# Patient Record
Sex: FEMALE | Race: WHITE | NOT HISPANIC OR LATINO | Employment: PART TIME | URBAN - METROPOLITAN AREA
[De-identification: names, ages, dates, MRNs, and addresses within clinical notes are randomized per-mention and may not be internally consistent; named-entity substitution may affect disease eponyms.]

---

## 2017-02-07 RX ORDER — NADOLOL 40 MG/1
TABLET ORAL
Qty: 30 TABLET | Refills: 6 | Status: SHIPPED | OUTPATIENT
Start: 2017-02-07 | End: 2017-09-12 | Stop reason: SDUPTHER

## 2017-09-12 RX ORDER — NADOLOL 40 MG/1
40 TABLET ORAL DAILY
Qty: 30 TABLET | Refills: 6 | Status: SHIPPED | OUTPATIENT
Start: 2017-09-12 | End: 2018-04-03 | Stop reason: SDUPTHER

## 2017-11-01 ENCOUNTER — HISTORICAL (OUTPATIENT)
Dept: RADIOLOGY | Facility: HOSPITAL | Age: 27
End: 2017-11-01

## 2017-11-01 LAB
BUN SERPL-MCNC: 11 MG/DL (ref 7–18)
CALCIUM SERPL-MCNC: 9 MG/DL (ref 8.5–10.1)
CHLORIDE SERPL-SCNC: 109 MMOL/L (ref 98–107)
CO2 SERPL-SCNC: 25 MMOL/L (ref 21–32)
CREAT SERPL-MCNC: 0.78 MG/DL (ref 0.55–1.02)
GLUCOSE SERPL-MCNC: 77 MG/DL (ref 74–106)
POTASSIUM SERPL-SCNC: 4.2 MMOL/L (ref 3.5–5.1)
SODIUM SERPL-SCNC: 143 MMOL/L (ref 136–145)

## 2017-12-10 NOTE — PROGRESS NOTES
12/10/2017    RE:ALAN LONGO  [facility]# [id number]    Sharmaine Jones MD   [address] [place] [address] [place] [address]    [facility]    I had the pleasure of seeing Alan in our adult congenital cardiology clinic in [place]. Alan Longo is a 27 y.o. female with repaired tetralogy of Fallot.  On 12/17/14, she underwent the following surgical procedure:  Redo median sternotomy, resection of right ventricular muscle bundles, pulmonary valve replacement with a 23 mm Trifecta valve and augmentation of the main pulmonary artery.  The surgery was complicated by adherence of the heart to the sternum, making dissection difficult.  They accessed the RFA for bypass, but were not able to get in the vein (chronic occlusion).  She ultimately did quite well, however, and was discharged home after 4 days.      Interval history:  I last saw her a year ago.  Since that time, she has in general done quite well.  She has had no chest pain, syncope, dyspnea on exertion, cyanosis, or edema.  She does have occasional palpitations.  She only notices this at night.  She will wake up from sleep, typically from a bad dream, and she feels like her heart is beating fast.  The symptoms last a few minutes.  This occurs a few times per week.  She does not have palpitations during the day.  She has no problems performing her normal daily activities.  She works in a pharmacy.    She has problems with both feet, and they are thinking about having surgery.  She has hypothyroidism.    She does choke on her food.  This was a bad problem as a child and got better, but continues to be a problem at times.  At the time, they were told a nerve was injured during heart surgery.  A few years ago, I had her seen in ENT clinic.  The conclusions from that visit were as follows:  In summary, this patient has normal vocal cord motion bilaterally, ruling out any functional impairment from a paralyzed  vocal cord given her remote history of TOF surgery. She does have a small glottic gap and this may contribute to her hoarseness and intermittent throat clearing with PO liquid. She tolerated PO liquid in the exam room without incident. It is likely that she is experiencing intermittent bouts of laryngeal penetration, not aspiration given her clinical picture. Discussed the only way to know for sure is with a MBSS. However, given the rarity of these episodes(Once every 1-2 weeks), it is not likely that we will catch any abnormality on that test. I have discussed a course of observation, as there is no immediate concern for aspiration. Should her symptoms of throat clearing with PO liquid intake increase in nature, we can then proceed with a MBSS to further characterize the nature of her dysphagia.     Several years ago, Jocelin was admitted to the hospital for evaluation after Holter demonstrated runs of ventricular tachycardia. During this admission, she had electrophysiology study which demonstrated no inducible ventricular arrhythmia and she was started on nadolol.     The review of systems is as noted above. It is otherwise negative for other symptoms related to the general, neurological, psychiatric, endocrine, gastrointestinal, genitourinary, respiratory, dermatologic, musculoskeletal, hematologic, and immunologic systems.     Current Outpatient Prescriptions on File Prior to Visit   Medication Sig Dispense Refill    aspirin (ECOTRIN) 81 MG EC tablet Take 81 mg by mouth once daily.      ibuprofen (ADVIL,MOTRIN) 800 MG tablet Take 800 mg by mouth every 8 (eight) hours as needed for Pain.      levothyroxine (SYNTHROID) 50 MCG tablet       nadolol (CORGARD) 40 MG tablet Take 1 tablet (40 mg total) by mouth once daily. 30 tablet 6     No current facility-administered medications on file prior to visit.    Review of patient's allergies indicates:  No Known Allergies  She is no longer taking the  "oxycodone.  IMMUNIZATIONS:UTD  SOCIAL: Currently lives with parents. She does not smoke. She will have an occasional alcoholic beverage.  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      Vitals:    12/11/17 1132 12/11/17 1133   BP: 126/76 135/87   BP Location: Right arm Left arm   Pulse: (!) 57    SpO2: 99%    Weight: 67.6 kg (149 lb 0.5 oz)    Height: 5' 2.21" (1.58 m)      GENERAL: Jocelin is a well-developed well-nourished female. She moves symmetrically. She does have a stutter.   HEENT: No dysmorphic features are noted. Visual tracking is normal with symmetric grimace. Teeth are in good repair. No lymphadenopathy is noted.    CHEST: There is a well healed median sternotomy scar and a well healed left thoracotomy scar.  CARDIAC: The precordium is quiet. S1 is single with a split S2. There is a grade 2/6 systolic ejection murmur followed by a 1/6 blowing diastolic murmur.  ABDOMEN: The abdomen is soft with no hepatosplenomegaly.  EXTREMITIES: Extremities are warm and well perfused. Pulses are good in all extremities with no edema clubbing or cyanosis noted.    I personally interpreted the following studies performed in clinic in October:  An EKG reveals normal sinus rhythm with a RBBB.  Echo reveals:  Very limited acoustic windows:.  Limited imaging suggests no obvious enlargement of the right ventricle and  qualitatively good systolic function.  Normal left ventricle structure and size.  Normal size aorta.  Bioprosthetic valve in pulmonary position not well visualized. No evidence of  stenosis or significant insufficiency.  Aortic valve poorly visualized. No stenosis. Likely trivial insufficiency.  LPA well visualized with no stenosis. RPA not as well visualized.    CPX testing 2016:  4) The PkVO2 was 20.6 ml/kg/min which is 55% of predicted equating to a functional capacity of 5.9 METS indicating moderate functional impairment.   CONCLUSIONS   CPX Conclusions:  Moderate functional impairment associated with a " normal breathing reserve, normal oxygen saturation, an adequate effort, and a borderline reduced AT. These findings are indicative of functional impairment secondary to circulatory insufficiency.     Cardiac MRI 12/2016:  Conclusion:   27 yo with tetralogy of Fallot s/p repair, s/p pulmonary valve replacement.   1. The right ventricle end diastolic volume is top normal, end systolic volume is increased. (RVEDV 110 cc/m2 for BSA, RVESV 65 cc/m2 for BSA).  RVEF 41%.  2. Normal left ventricular size with LVEF 60 %.  3. There is a pulmonary valve prosthesis. The RVOT was difficult to visualize due to artifact from sternal wires. Pulmonary insufficiency, regurgitant fraction 14%. Peak velocity 1.7 m/sec.  4. Normal size of main and RPA, mild LPA hypoplasia.   5. Aortic insufficiency, regurgitant fraction 11%, regurgitant volume 7 cc.   6. Dilated ascending aorta, meaurements as above.   7. Right atrial enlargement.   8. Tricuspid regurgitation noted.     Diagnoses:  1. Tetralogy of Fallot status post repair.  2. 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 Palpitations with a history of ventricular arrhythmia on Holter monitor and a negative EP study.  4. Mild AI  5. Choking on food - chronic issue.  No vocal cord dysfunction noted on ENT evaluation, but she does have a mild glottic gap.  6. Consider 22q11 deletion.  7. Bilateral iliac and infra-renal IVC thrombosis    Discussion:  Overall, she looks very good in clinic today.  Her echocardiogram is reassuring, but the images are suboptimal.  She does have some palpitations, and we know she has a history of nonsustained ventricular tachycardia.  To summarize, my recommendations are as follows:   1. continue aspirin  2. Sternotomy precautions no longer necessary  3. SBEP is necessary  4. 24 hour holter today to  once again assess night time palpitations  5. Continue Nadolol.  6. If holter and MRI look good, will see in 1 year with echo, ekg, holter.    Sincerely,    Mazin Catalan MD  Pediatric Cardiology  Adult Congenital Heart Disease  [facility] [address] [place] [address]  [phone]

## 2017-12-11 ENCOUNTER — CLINICAL SUPPORT (OUTPATIENT)
Dept: PEDIATRIC CARDIOLOGY | Facility: CLINIC | Age: 27
End: 2017-12-11
Payer: COMMERCIAL

## 2017-12-11 ENCOUNTER — CLINICAL SUPPORT (OUTPATIENT)
Dept: PEDIATRIC CARDIOLOGY | Facility: CLINIC | Age: 27
End: 2017-12-11
Attending: PEDIATRICS
Payer: COMMERCIAL

## 2017-12-11 ENCOUNTER — OFFICE VISIT (OUTPATIENT)
Dept: PEDIATRIC CARDIOLOGY | Facility: CLINIC | Age: 27
End: 2017-12-11
Payer: COMMERCIAL

## 2017-12-11 VITALS
WEIGHT: 149.06 LBS | OXYGEN SATURATION: 99 % | HEIGHT: 62 IN | HEART RATE: 57 BPM | DIASTOLIC BLOOD PRESSURE: 87 MMHG | BODY MASS INDEX: 27.43 KG/M2 | SYSTOLIC BLOOD PRESSURE: 135 MMHG

## 2017-12-11 VITALS
HEART RATE: 52 BPM | BODY MASS INDEX: 27.42 KG/M2 | WEIGHT: 149 LBS | DIASTOLIC BLOOD PRESSURE: 87 MMHG | RESPIRATION RATE: 16 BRPM | SYSTOLIC BLOOD PRESSURE: 135 MMHG | OXYGEN SATURATION: 99 % | HEIGHT: 62 IN

## 2017-12-11 DIAGNOSIS — I47.20 VENTRICULAR TACHYCARDIA: ICD-10-CM

## 2017-12-11 DIAGNOSIS — Z98.890 PERSONAL HISTORY OF SURGERY TO HEART AND GREAT VESSELS, PRESENTING HAZARDS TO HEALTH: ICD-10-CM

## 2017-12-11 DIAGNOSIS — Z95.3 S/P PULMONARY VALVE REPLACEMENT WITH BIOPROSTHETIC VALVE: ICD-10-CM

## 2017-12-11 DIAGNOSIS — Z87.74 TETRALOGY OF FALLOT S/P REPAIR: Primary | ICD-10-CM

## 2017-12-11 DIAGNOSIS — Q21.3 TETRALOGY OF FALLOT: ICD-10-CM

## 2017-12-11 DIAGNOSIS — Q24.9 ADULT CONGENITAL HEART DISEASE: ICD-10-CM

## 2017-12-11 DIAGNOSIS — R00.2 PALPITATIONS: ICD-10-CM

## 2017-12-11 DIAGNOSIS — R00.2 PALPITATIONS: Primary | ICD-10-CM

## 2017-12-11 PROCEDURE — 93000 ELECTROCARDIOGRAM COMPLETE: CPT | Mod: S$GLB,,, | Performed by: PEDIATRICS

## 2017-12-11 PROCEDURE — 99214 OFFICE O/P EST MOD 30 MIN: CPT | Mod: 25,S$GLB,, | Performed by: PEDIATRICS

## 2017-12-11 PROCEDURE — 93227 XTRNL ECG REC<48 HR R&I: CPT | Mod: S$GLB,,, | Performed by: PEDIATRICS

## 2018-04-03 RX ORDER — NADOLOL 40 MG/1
TABLET ORAL
Qty: 30 TABLET | Refills: 11 | Status: SHIPPED | OUTPATIENT
Start: 2018-04-03 | End: 2019-03-27 | Stop reason: SDUPTHER

## 2018-09-28 ENCOUNTER — DOCUMENTATION ONLY (OUTPATIENT)
Dept: PEDIATRIC CARDIOLOGY | Facility: CLINIC | Age: 28
End: 2018-09-28

## 2018-09-28 ENCOUNTER — TELEPHONE (OUTPATIENT)
Dept: PEDIATRIC CARDIOLOGY | Facility: CLINIC | Age: 28
End: 2018-09-28

## 2018-09-28 NOTE — TELEPHONE ENCOUNTER
Spoke with Dr Catalan regarding patients b/p concerns. Informed mother that Dr. Catalan wants her to go the ER to get checked out for the high b/p. Made mom aware that the  prolong b/p can lead to a stroke or renal failure, this b/p issues is not heart related. Mom verified that she understands and will head to the ER in 30 min.

## 2018-09-28 NOTE — PROGRESS NOTES
The family called and spoke with my nursing staff.  Jocelin has had headache and severely elevated blood pressure.  We inform them that her congenital heart disease is not associated with high blood pressure, and her blood pressure needs to be evaluated immediately.  She was instructed to go to her local emergency room.

## 2018-09-28 NOTE — TELEPHONE ENCOUNTER
Spoke with patient mother via telephone. Patient has been complaining of headaches and racing heart beat since last night. Mother took patient blood pressure, reported to be 193/107. Mother repeated blood pressure until lowest prior to bed was 178-95. Mother reported to RN she did not go to ER last night because she feels that they do not do anything for patient due to heart issues. Mother reported blood pressure was 178-106 this AM. Patient has not been ill or running fever in the last month. Patient has been taking Nadolol appropriately and reported has not missed any doses. The only change is patient has started taking Vitamin D for past week and reports to have headaches for past week. Informed mother to bring patient to ER to have her evaluated. Will update Dr. Catalan on plan of care at this time.

## 2018-10-01 ENCOUNTER — TELEPHONE (OUTPATIENT)
Dept: CARDIOLOGY | Facility: CLINIC | Age: 28
End: 2018-10-01

## 2018-10-01 NOTE — TELEPHONE ENCOUNTER
Patient's blood pressure is elevated again this morning (160/101).  ED instructed them to go back if it got this high again. Mother calling to let us know.  Mother is frustrated with continuing elevated BP.  Suggested she contact nephrology to have her evaluated.  She expressed understanding and will contact nephrology either before or after ED visit.

## 2018-10-01 NOTE — TELEPHONE ENCOUNTER
----- Message from Kamilla Jacques sent at 10/1/2018  9:55 AM CDT -----  Contact: Desiree Diamond [phone]  Needs Advice    Reason for call: Er visit/elevated BP on friday        Communication Preference: Desiree Diamond [phone]    Additional Information: went to ED on Friday for eval and wants to discuss er findings with Dr. Catalan as well as Rx prescribed by ER doctor.

## 2018-10-01 NOTE — TELEPHONE ENCOUNTER
Spoke to mom a few minutes ago and scheduled pt 10/11 in [place].    Called back and spoke to dad.  R/s appts to [facility] 10/8.  Father will relay to mother.  Address given.

## 2018-10-03 ENCOUNTER — TELEPHONE (OUTPATIENT)
Dept: PEDIATRIC CARDIOLOGY | Facility: CLINIC | Age: 28
End: 2018-10-03

## 2018-10-03 NOTE — TELEPHONE ENCOUNTER
Mom messaged staff concerning elevated b/p and when should she take the clonidine tab the ER prescribed for high b/p. Spoke with Dr Hossein URIAS regarding mothers concerns. Informed her that he will up Losartan dose to 100 mg daily. Mom states she has been taking 100mg every day since Monday, and her b/p is elevated at night. Encourage her to take Losartan 50 mg twice a day, once in the morning and once at night. Dr Catalan suggest if the b/p is over 180/110 to take the clonidine does the ER prescribed. Mom verifies understanding all information.

## 2018-10-08 ENCOUNTER — OFFICE VISIT (OUTPATIENT)
Dept: PEDIATRIC CARDIOLOGY | Facility: CLINIC | Age: 28
End: 2018-10-08
Payer: COMMERCIAL

## 2018-10-08 ENCOUNTER — CLINICAL SUPPORT (OUTPATIENT)
Dept: PEDIATRIC CARDIOLOGY | Facility: CLINIC | Age: 28
End: 2018-10-08
Payer: COMMERCIAL

## 2018-10-08 VITALS
WEIGHT: 152 LBS | OXYGEN SATURATION: 100 % | HEART RATE: 61 BPM | BODY MASS INDEX: 27.97 KG/M2 | RESPIRATION RATE: 18 BRPM | DIASTOLIC BLOOD PRESSURE: 84 MMHG | HEIGHT: 62 IN | SYSTOLIC BLOOD PRESSURE: 125 MMHG

## 2018-10-08 DIAGNOSIS — Z95.3 S/P PULMONARY VALVE REPLACEMENT WITH BIOPROSTHETIC VALVE: ICD-10-CM

## 2018-10-08 DIAGNOSIS — Q21.3 TETRALOGY OF FALLOT: ICD-10-CM

## 2018-10-08 DIAGNOSIS — I47.20 VENTRICULAR TACHYCARDIA: ICD-10-CM

## 2018-10-08 DIAGNOSIS — Q24.9 ADULT CONGENITAL HEART DISEASE: ICD-10-CM

## 2018-10-08 DIAGNOSIS — Z98.890 PERSONAL HISTORY OF SURGERY TO HEART AND GREAT VESSELS, PRESENTING HAZARDS TO HEALTH: ICD-10-CM

## 2018-10-08 PROCEDURE — 93000 ELECTROCARDIOGRAM COMPLETE: CPT | Mod: S$GLB,,, | Performed by: PEDIATRICS

## 2018-10-08 PROCEDURE — 99214 OFFICE O/P EST MOD 30 MIN: CPT | Mod: 25,S$GLB,, | Performed by: PEDIATRICS

## 2018-10-08 PROCEDURE — 3008F BODY MASS INDEX DOCD: CPT | Mod: CPTII,S$GLB,, | Performed by: PEDIATRICS

## 2018-10-08 RX ORDER — LOSARTAN POTASSIUM 50 MG/1
50 TABLET ORAL 2 TIMES DAILY
Qty: 60 TABLET | Refills: 11 | Status: SHIPPED | OUTPATIENT
Start: 2018-10-08 | End: 2019-10-14 | Stop reason: SDUPTHER

## 2018-10-08 RX ORDER — HYDROCHLOROTHIAZIDE 12.5 MG/1
12.5 CAPSULE ORAL
COMMUNITY
Start: 2018-09-28 | End: 2018-11-14 | Stop reason: SDUPTHER

## 2018-10-08 RX ORDER — ACETAMINOPHEN AND CODEINE PHOSPHATE 120; 12 MG/5ML; MG/5ML
1 SOLUTION ORAL DAILY
Refills: 11 | COMMUNITY
Start: 2018-09-10 | End: 2022-09-21

## 2018-10-08 RX ORDER — LOSARTAN POTASSIUM 50 MG/1
50 TABLET ORAL
COMMUNITY
Start: 2018-09-29 | End: 2018-10-08 | Stop reason: SDUPTHER

## 2018-10-08 NOTE — LETTER
October 8, 2018      Sharmaine Jones MD  [address] [place] [address] [place] [address]           [facility]  [address] [place] [address]  [place] [address]  Phone: [phone]  Fax: [phone]          Patient: Jocelin Longo   MR Number: [id number]   YOB: 1990   Date of Visit: 10/8/2018       Dear Dr. Sharmaine Jones:    Thank you for referring Jocelin Longo to me for evaluation. Attached you will find relevant portions of my assessment and plan of care.    If you have questions, please do not hesitate to call me. I look forward to following Jocelin Longo along with you.    Sincerely,    Mazin Catalan MD    Enclosure  CC:  No Recipients    If you would like to receive this communication electronically, please contact [email or web address] or [phone] to request more information on [facility] Link access.    For providers and/or their staff who would like to refer a patient to [facility], please contact us through our one-stop-shop provider referral line, [facility], at [phone].    If you feel you have received this communication in error or would no longer like to receive these types of communications, please e-mail [email or web address]

## 2018-10-08 NOTE — PROGRESS NOTES
10/08/2018    RE:ALAN LONGO  [facility]# [id number]    Sharmaine Jones MD   [address] [place] [address] [place] [address]    [facility]    I had the pleasure of seeing Alan in our adult congenital cardiology clinic in [place]. Alan Longo is a 28 y.o. female with repaired tetralogy of Fallot.  On 12/17/14, she underwent the following surgical procedure:  Redo median sternotomy, resection of right ventricular muscle bundles, pulmonary valve replacement with a 23 mm Trifecta valve and augmentation of the main pulmonary artery.  The surgery was complicated by adherence of the heart to the sternum, making dissection difficult.  They accessed the RFA for bypass, but were not able to get in the vein (chronic occlusion).  She ultimately did quite well, however, and was discharged home after 4 days.      Interval history:  I last saw her about 11 months ago.  She had done well until about 2 weeks ago when she developed a headache associated with her menstrual period.  Mom checked a blood pressure, and it was very sade (around 200/100).  At my urging, she went to the ER where her BP was quite high.  She has been started on losartan and HCTZ, and the blood pressure has improved.  She denies edema, chest pain, dyspnea on exertion, shortness of breath.  She denies hematuria.  She had a renal US and abdominal CTA that showed no evidence of kidney disease or renal artery stenosis, and her creatinine and thyroid studies were normal.  Mom wonders if this could be hormonal in nature as she frequently gets headaches with her cycle.  Her BP has been under much better control with losartan BID instead of daily.    She has problems with both feet, and they are thinking about having surgery.  She has hypothyroidism.    She does choke on her food.  This was a bad problem as a child and got better, but continues to be a problem at times.  At the time, they were told a nerve was  injured during heart surgery.  A few years ago, I had her seen in ENT clinic.  The conclusions from that visit were as follows:  In summary, this patient has normal vocal cord motion bilaterally, ruling out any functional impairment from a paralyzed vocal cord given her remote history of TOF surgery. She does have a small glottic gap and this may contribute to her hoarseness and intermittent throat clearing with PO liquid. She tolerated PO liquid in the exam room without incident. It is likely that she is experiencing intermittent bouts of laryngeal penetration, not aspiration given her clinical picture. Discussed the only way to know for sure is with a MBSS. However, given the rarity of these episodes(Once every 1-2 weeks), it is not likely that we will catch any abnormality on that test. I have discussed a course of observation, as there is no immediate concern for aspiration. Should her symptoms of throat clearing with PO liquid intake increase in nature, we can then proceed with a MBSS to further characterize the nature of her dysphagia.     Several years ago, Jocelin was admitted to the hospital for evaluation after Holter demonstrated runs of ventricular tachycardia. During this admission, she had electrophysiology study which demonstrated no inducible ventricular arrhythmia and she was started on nadolol.     The review of systems is as noted above. It is otherwise negative for other symptoms related to the general, neurological, psychiatric, endocrine, gastrointestinal, genitourinary, respiratory, dermatologic, musculoskeletal, hematologic, and immunologic systems.     Current Outpatient Medications on File Prior to Visit   Medication Sig Dispense Refill    hydroCHLOROthiazide (MICROZIDE) 12.5 mg capsule Take 12.5 mg by mouth.      [DISCONTINUED] losartan (COZAAR) 50 MG tablet Take 50 mg by mouth.      aspirin (ECOTRIN) 81 MG EC tablet Take 81 mg by mouth once daily.      ibuprofen (ADVIL,MOTRIN) 800 MG  "tablet Take 800 mg by mouth every 8 (eight) hours as needed for Pain.      levothyroxine (SYNTHROID) 50 MCG tablet       nadolol (CORGARD) 40 MG tablet take 1 tablet by mouth once a day 30 tablet 11    norethindrone (MICRONOR) 0.35 mg tablet Take 1 tablet by mouth once daily.  11    ranitidine (ZANTAC) 150 MG tablet Take 150 mg by mouth once daily.       No current facility-administered medications on file prior to visit.    Review of patient's allergies indicates:  No Known Allergies  She is no longer taking the oxycodone.  IMMUNIZATIONS:UTD  SOCIAL: Currently lives with parents. She does not smoke. She will have an occasional alcoholic beverage.  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      Vitals:    10/08/18 1011   BP: 125/84   BP Location: Right arm   Patient Position: Sitting   BP Method: Medium (Automatic)   Pulse: 61   Resp: 18   SpO2: 100%   Weight: 68.9 kg (152 lb)   Height: 5' 2.21" (1.58 m)     GENERAL: Jocelin is a well-developed well-nourished female. She moves symmetrically. She does have a stutter.   HEENT: No dysmorphic features are noted. Visual tracking is normal with symmetric grimace. Teeth are in good repair. No lymphadenopathy is noted.    CHEST: There is a well healed median sternotomy scar and a well healed left thoracotomy scar.  CARDIAC: The precordium is quiet. S1 is single with a split S2. There is a grade 1/6 systolic ejection murmur.  ABDOMEN: The abdomen is soft with no hepatosplenomegaly.  EXTREMITIES: Extremities are warm and well perfused. Pulses are good in all extremities with no edema clubbing or cyanosis noted.    I personally interpreted the following studies performed in clinic in October:  An EKG reveals normal sinus rhythm with a RBBB.  Echo reveals:  Very limited acoustic windows:.  Limited imaging suggests no obvious enlargement of the right ventricle and  qualitatively good systolic function.  Normal left ventricle structure and size.  Normal size " aorta.  Bioprosthetic valve in pulmonary position not well visualized. No evidence of  stenosis or significant insufficiency.  Aortic valve poorly visualized. No stenosis. Likely trivial insufficiency.  LPA well visualized with no stenosis. RPA not as well visualized.    CPX testing 2016:  4) The PkVO2 was 20.6 ml/kg/min which is 55% of predicted equating to a functional capacity of 5.9 METS indicating moderate functional impairment.   CONCLUSIONS   CPX Conclusions:  Moderate functional impairment associated with a normal breathing reserve, normal oxygen saturation, an adequate effort, and a borderline reduced AT. These findings are indicative of functional impairment secondary to circulatory insufficiency.     Cardiac MRI 12/2016:  Conclusion:   25 yo with tetralogy of Fallot s/p repair, s/p pulmonary valve replacement.   1. The right ventricle end diastolic volume is top normal, end systolic volume is increased. (RVEDV 110 cc/m2 for BSA, RVESV 65 cc/m2 for BSA).  RVEF 41%.  2. Normal left ventricular size with LVEF 60 %.  3. There is a pulmonary valve prosthesis. The RVOT was difficult to visualize due to artifact from sternal wires. Pulmonary insufficiency, regurgitant fraction 14%. Peak velocity 1.7 m/sec.  4. Normal size of main and RPA, mild LPA hypoplasia.   5. Aortic insufficiency, regurgitant fraction 11%, regurgitant volume 7 cc.   6. Dilated ascending aorta, meaurements as above.   7. Right atrial enlargement.   8. Tricuspid regurgitation noted.     Diagnoses:  1. Tetralogy of Fallot status post repair.  2. 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 Palpitations with a history of ventricular arrhythmia on Holter monitor and a negative EP study.  4. Mild AI  5. Choking on food - chronic issue.  No vocal cord dysfunction noted on ENT evaluation,  but she does have a mild glottic gap.  6. Consider 22q11 deletion.  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 Significant hypertension.  Not due to her congenital heart disease.    Discussion:  Her hypertension is significant and is NOT related to her tetralogy of Fallot.  She is under much better control..  To summarize, my recommendations are as follows:   1. continue aspirin  2. continue current doses of losartan, HCTZ, nadalol  3. SBEP is necessary  4. follow up in 3 months with vitals only.  5. Contact me with any issues.  Any neuro changes, shortness of breath, edema, etc... should prompt immediate evaluation.    Sincerely,    Mazin Catalan MD  Pediatric Cardiology  Adult Congenital Heart Disease  [facility]  [address] [place] [address]  [phone]

## 2018-10-08 NOTE — PROGRESS NOTES
Pleasant cooperative young lady with extremely poor acoustic windows.      1. S/p TOF complete repair.  2. Subjectively normal LV systolic function.

## 2018-11-14 RX ORDER — HYDROCHLOROTHIAZIDE 12.5 MG/1
12.5 CAPSULE ORAL DAILY
Qty: 30 CAPSULE | Refills: 10 | Status: SHIPPED | OUTPATIENT
Start: 2018-11-14 | End: 2018-12-10

## 2018-11-14 NOTE — TELEPHONE ENCOUNTER
----- Message from Kamilla Jacques sent at 11/14/2018  2:27 PM CST -----  Contact: Desiree Bowers [phone]  Needs Advice    Reason for call: Rx hydroCHLOROthiazide (MICROZIDE) 12.5 mg capsule        Communication Preference: Desiree Bowers [phone]    Additional Information: Pt has run out of fluid pills and BP is beginning to rise again. Mom is wondering if the pills should be refilled or if Dr. Catalan would like to wait until he sees the pt to make that decision.

## 2018-12-10 ENCOUNTER — OFFICE VISIT (OUTPATIENT)
Dept: PEDIATRIC CARDIOLOGY | Facility: CLINIC | Age: 28
End: 2018-12-10
Payer: COMMERCIAL

## 2018-12-10 VITALS
DIASTOLIC BLOOD PRESSURE: 89 MMHG | SYSTOLIC BLOOD PRESSURE: 139 MMHG | RESPIRATION RATE: 18 BRPM | WEIGHT: 150 LBS | OXYGEN SATURATION: 99 % | HEART RATE: 54 BPM | BODY MASS INDEX: 27.6 KG/M2 | HEIGHT: 62 IN

## 2018-12-10 DIAGNOSIS — Z87.74 TETRALOGY OF FALLOT S/P REPAIR: ICD-10-CM

## 2018-12-10 DIAGNOSIS — Q24.9 ADULT CONGENITAL HEART DISEASE: Primary | ICD-10-CM

## 2018-12-10 DIAGNOSIS — I10 ESSENTIAL HYPERTENSION: ICD-10-CM

## 2018-12-10 DIAGNOSIS — Z98.890 PERSONAL HISTORY OF SURGERY TO HEART AND GREAT VESSELS, PRESENTING HAZARDS TO HEALTH: ICD-10-CM

## 2018-12-10 DIAGNOSIS — Z95.3 S/P PULMONARY VALVE REPLACEMENT WITH BIOPROSTHETIC VALVE: ICD-10-CM

## 2018-12-10 PROCEDURE — 99213 OFFICE O/P EST LOW 20 MIN: CPT | Mod: S$GLB,,, | Performed by: PEDIATRICS

## 2018-12-10 RX ORDER — HYDROCHLOROTHIAZIDE 25 MG/1
25 TABLET ORAL DAILY
Qty: 30 TABLET | Refills: 11 | Status: SHIPPED | OUTPATIENT
Start: 2018-12-10 | End: 2019-12-02 | Stop reason: SDUPTHER

## 2018-12-10 NOTE — PROGRESS NOTES
12/10/2018    RE:ALAN LONGO  [facility]# [id number]    Sharmaine Jones MD   [address] [place] [address] [place] [address]    [facility]    I had the pleasure of seeing Alan in our adult congenital cardiology clinic in [place]. Alan Longo is a 28 y.o. female with repaired tetralogy of Fallot.  To summarize, her diagnoses are as follows:   Diagnoses:  1. Tetralogy of Fallot status post repair.  2. 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 Palpitations with a history of ventricular arrhythmia on Holter monitor and a negative EP study.  4. Mild AI  5. Choking on food - chronic issue.  No vocal cord dysfunction noted on ENT evaluation, but she does have a mild glottic gap.  6. Consider 22q11 deletion.  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 Significant hypertension.  Not due to her congenital heart disease.    Discussion:  Her hypertension is significant and is NOT related to her tetralogy of Fallot.  She remains mildly hypertensive.  To summarize, my recommendations are as follows:   1. increase HCTZ to 25mg daily  2. continue current doses of losartan, nadalol, aspirin  3. SBEP is necessary  4. follow up in 3 months with vitals only.  At a minimum, repeat echo October 2019.  5. Contact me with any issues.  Any neuro changes, shortness of breath, edema, etc... should prompt immediate evaluation.    Interval history:  She has done well over the last 3 months with no chest pain, palpitations, dyspnea on exertion, palpitations, shortness of breath, or edema.  In the morning, her BP is typically around 120/80.  In the evening, it is often higher, around 140-150 systolic.    I last saw her on 10/8/18.  At that time, I got the following history:  She had done well until about 2 weeks ago  when she developed a headache associated with her menstrual period.  Mom checked a blood pressure, and it was very sade (around 200/100).  At my urging, she went to the ER where her BP was quite high.  She has been started on losartan and HCTZ, and the blood pressure has improved.  She denies edema, chest pain, dyspnea on exertion, shortness of breath.  She denies hematuria.  She had a renal US and abdominal CTA that showed no evidence of kidney disease or renal artery stenosis, and her creatinine and thyroid studies were normal.  Mom wonders if this could be hormonal in nature as she frequently gets headaches with her cycle.  Her BP has been under much better control with losartan BID instead of daily.    She has problems with both feet, and they are thinking about having surgery.  She has hypothyroidism.  On 12/17/14, she underwent the following surgical procedure:  Redo median sternotomy, resection of right ventricular muscle bundles, pulmonary valve replacement with a 23 mm Trifecta valve and augmentation of the main pulmonary artery.  The surgery was complicated by adherence of the heart to the sternum, making dissection difficult.  They accessed the RFA for bypass, but were not able to get in the vein (chronic occlusion).  She ultimately did quite well, however, and was discharged home after 4 days.      She does choke on her food.  This was a bad problem as a child and got better, but continues to be a problem at times.  At the time, they were told a nerve was injured during heart surgery.  A few years ago, I had her seen in ENT clinic.  The conclusions from that visit were as follows:  In summary, this patient has normal vocal cord motion bilaterally, ruling out any functional impairment from a paralyzed vocal cord given her remote history of TOF surgery. She does have a small glottic gap and this may contribute to her hoarseness and intermittent throat clearing with PO liquid. She tolerated PO liquid in  the exam room without incident. It is likely that she is experiencing intermittent bouts of laryngeal penetration, not aspiration given her clinical picture. Discussed the only way to know for sure is with a MBSS. However, given the rarity of these episodes(Once every 1-2 weeks), it is not likely that we will catch any abnormality on that test. I have discussed a course of observation, as there is no immediate concern for aspiration. Should her symptoms of throat clearing with PO liquid intake increase in nature, we can then proceed with a MBSS to further characterize the nature of her dysphagia.     Several years ago, Jocelin was admitted to the hospital for evaluation after Holter demonstrated runs of ventricular tachycardia. During this admission, she had electrophysiology study which demonstrated no inducible ventricular arrhythmia and she was started on nadolol.     The review of systems is as noted above. It is otherwise negative for other symptoms related to the general, neurological, psychiatric, endocrine, gastrointestinal, genitourinary, respiratory, dermatologic, musculoskeletal, hematologic, and immunologic systems.     Current Outpatient Medications on File Prior to Visit   Medication Sig Dispense Refill    aspirin (ECOTRIN) 81 MG EC tablet Take 81 mg by mouth once daily.      ibuprofen (ADVIL,MOTRIN) 800 MG tablet Take 800 mg by mouth every 8 (eight) hours as needed for Pain.      levothyroxine (SYNTHROID) 50 MCG tablet       losartan (COZAAR) 50 MG tablet Take 1 tablet (50 mg total) by mouth 2 (two) times daily. 60 tablet 11    nadolol (CORGARD) 40 MG tablet take 1 tablet by mouth once a day 30 tablet 11    norethindrone (MICRONOR) 0.35 mg tablet Take 1 tablet by mouth once daily.  11    ranitidine (ZANTAC) 150 MG tablet Take 150 mg by mouth once daily.      [DISCONTINUED] hydroCHLOROthiazide (MICROZIDE) 12.5 mg capsule Take 1 capsule (12.5 mg total) by mouth once daily. 30 capsule 10     No  "current facility-administered medications on file prior to visit.    Review of patient's allergies indicates:  No Known Allergies  She is no longer taking the oxycodone.  IMMUNIZATIONS:UTD  SOCIAL: Currently lives with parents. She does not smoke. She will have an occasional alcoholic beverage.  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      Vitals:    12/10/18 0819   BP: 139/89   BP Location: Right arm   Patient Position: Sitting   BP Method: Medium (Automatic)   Pulse: (!) 54   Resp: 18   SpO2: 99%   Weight: 68 kg (150 lb)   Height: 5' 2.21" (1.58 m)     GENERAL: Jocelin is a well-developed well-nourished female. She moves symmetrically. She does have a stutter.   HEENT: No dysmorphic features are noted. Visual tracking is normal with symmetric grimace. Teeth are in good repair. No lymphadenopathy is noted.    CHEST: There is a well healed median sternotomy scar and a well healed left thoracotomy scar.  CARDIAC: The precordium is quiet. S1 is single with a split S2. There is a grade 1/6 systolic ejection murmur and a 1-2/6 soft, low pitched diastolic murmur at the RUSB.  ABDOMEN: The abdomen is soft with no hepatosplenomegaly.  EXTREMITIES: Extremities are warm and well perfused. Pulses are good in all extremities with no edema clubbing or cyanosis noted.    I personally interpreted the following studies performed in clinic in October:    Echo 10/8/18 reveals:  Very limited acoustic windows:.  Limited imaging suggests no obvious enlargement of the right ventricle and  qualitatively good systolic function.  Normal left ventricle structure and size.  Normal size aorta.  Bioprosthetic valve in pulmonary position not well visualized. No evidence of  stenosis or significant insufficiency.  Aortic valve poorly visualized. No stenosis. Likely trivial insufficiency.  LPA well visualized with no stenosis. RPA not as well visualized.    CPX testing 2016:  4) The PkVO2 was 20.6 ml/kg/min which is 55% of predicted equating " to a functional capacity of 5.9 METS indicating moderate functional impairment.   CONCLUSIONS   CPX Conclusions:  Moderate functional impairment associated with a normal breathing reserve, normal oxygen saturation, an adequate effort, and a borderline reduced AT. These findings are indicative of functional impairment secondary to circulatory insufficiency.     Cardiac MRI 12/2016:  Conclusion:   27 yo with tetralogy of Fallot s/p repair, s/p pulmonary valve replacement.   1. The right ventricle end diastolic volume is top normal, end systolic volume is increased. (RVEDV 110 cc/m2 for BSA, RVESV 65 cc/m2 for BSA).  RVEF 41%.  2. Normal left ventricular size with LVEF 60 %.  3. There is a pulmonary valve prosthesis. The RVOT was difficult to visualize due to artifact from sternal wires. Pulmonary insufficiency, regurgitant fraction 14%. Peak velocity 1.7 m/sec.  4. Normal size of main and RPA, mild LPA hypoplasia.   5. Aortic insufficiency, regurgitant fraction 11%, regurgitant volume 7 cc.   6. Dilated ascending aorta, meaurements as above.   7. Right atrial enlargement.   8. Tricuspid regurgitation noted.     Sincerely,    Mazin Catalan MD  Pediatric Cardiology  Adult Congenital Heart Disease  [facility]  [address] [place] [address]  [phone]

## 2019-03-01 DIAGNOSIS — Q24.9 ADULT CONGENITAL HEART DISEASE: Primary | ICD-10-CM

## 2019-03-11 ENCOUNTER — OFFICE VISIT (OUTPATIENT)
Dept: PEDIATRIC CARDIOLOGY | Facility: CLINIC | Age: 29
End: 2019-03-11
Payer: COMMERCIAL

## 2019-03-11 VITALS
SYSTOLIC BLOOD PRESSURE: 112 MMHG | WEIGHT: 154 LBS | HEIGHT: 62 IN | BODY MASS INDEX: 28.34 KG/M2 | OXYGEN SATURATION: 96 % | DIASTOLIC BLOOD PRESSURE: 70 MMHG | RESPIRATION RATE: 16 BRPM | HEART RATE: 64 BPM

## 2019-03-11 DIAGNOSIS — Q24.9 ADULT CONGENITAL HEART DISEASE: Primary | ICD-10-CM

## 2019-03-11 DIAGNOSIS — I10 ESSENTIAL HYPERTENSION: ICD-10-CM

## 2019-03-11 DIAGNOSIS — Z95.3 S/P PULMONARY VALVE REPLACEMENT WITH BIOPROSTHETIC VALVE: ICD-10-CM

## 2019-03-11 PROCEDURE — 99213 PR OFFICE/OUTPT VISIT, EST, LEVL III, 20-29 MIN: ICD-10-PCS | Mod: S$GLB,,, | Performed by: PEDIATRICS

## 2019-03-11 PROCEDURE — 3008F PR BODY MASS INDEX (BMI) DOCUMENTED: ICD-10-PCS | Mod: CPTII,S$GLB,, | Performed by: PEDIATRICS

## 2019-03-11 PROCEDURE — 99213 OFFICE O/P EST LOW 20 MIN: CPT | Mod: S$GLB,,, | Performed by: PEDIATRICS

## 2019-03-11 PROCEDURE — 3078F PR MOST RECENT DIASTOLIC BLOOD PRESSURE < 80 MM HG: ICD-10-PCS | Mod: CPTII,S$GLB,, | Performed by: PEDIATRICS

## 2019-03-11 PROCEDURE — 3074F PR MOST RECENT SYSTOLIC BLOOD PRESSURE < 130 MM HG: ICD-10-PCS | Mod: CPTII,S$GLB,, | Performed by: PEDIATRICS

## 2019-03-11 PROCEDURE — 3008F BODY MASS INDEX DOCD: CPT | Mod: CPTII,S$GLB,, | Performed by: PEDIATRICS

## 2019-03-11 PROCEDURE — 3078F DIAST BP <80 MM HG: CPT | Mod: CPTII,S$GLB,, | Performed by: PEDIATRICS

## 2019-03-11 PROCEDURE — 3074F SYST BP LT 130 MM HG: CPT | Mod: CPTII,S$GLB,, | Performed by: PEDIATRICS

## 2019-03-11 NOTE — PROGRESS NOTES
03/11/2019    RE:ALAN LONGO  [facility]# [id number]    Sharmaine Jones MD   [address] [place] [address] [place] [address]     Dr. Nahid Junior  Oral Surgeon  [place], [place] [facility]    Dear Doctors:    I had the pleasure of seeing Alan in our adult congenital cardiology clinic in [place]. Alan Longo is a 29 y.o. female with repaired tetralogy of Fallot.  To summarize, her diagnoses are as follows:   Diagnoses:  1. Tetralogy of Fallot status post repair.  2. 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 Palpitations with a history of ventricular arrhythmia on Holter monitor and a negative EP study.  4. Mild AI  5. Choking on food - chronic issue.  No vocal cord dysfunction noted on ENT evaluation, but she does have a mild glottic gap.  6. Consider 22q11 deletion.  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 Significant hypertension.  Not due to her congenital heart disease.  Much improved.    Discussion:  Her blood pressure is better.  To summarize, my recommendations are as follows:   1. She is cleared from a cardiac standpoint for her upcoming oral surgery.  She definitely needs antibiotic prophylaxis before the procedure.  Her mother tells me that IV antibiotics will be given.  It is fine to hold her aspirin for 1 week prior to the procedure and to restarted a few days later.  2. continue current doses of HCTZ, losartan, nadalol, aspirin  3. SBEP is necessary  4. follow up in 6 months in [place] with a repeat EKG, echocardiogram, and Holter.  5. Contact me with any issues.  Any neuro changes, shortness of breath, edema, etc... should prompt immediate evaluation.    Interval history:  Overall, she has done very well since I last saw her a few months ago.  Her blood pressure has  been stable in the 110s to 120s.  She has had no worsening shortness of breath, exercise intolerance, palpitations, orthopnea, syncope, near syncope, cyanosis, or edema.  She did have 1 brief episode of midsternal sharp chest pain a few weeks ago.  She had gotten home from work.  She was fixing her coffee.  The pain resolved over a minute or 2 as she took deep breaths.  The pain was not pleuritic.    I previously saw her on 10/8/18.  At that time, I got the following history:  She had done well until about 2 weeks ago when she developed a headache associated with her menstrual period.  Mom checked a blood pressure, and it was very sade (around 200/100).  At my urging, she went to the ER where her BP was quite high.  She has been started on losartan and HCTZ, and the blood pressure has improved.  She denies edema, chest pain, dyspnea on exertion, shortness of breath.  She denies hematuria.  She had a renal US and abdominal CTA that showed no evidence of kidney disease or renal artery stenosis, and her creatinine and thyroid studies were normal.  Mom wonders if this could be hormonal in nature as she frequently gets headaches with her cycle.  Her BP has been under much better control with losartan BID instead of daily.    She has problems with both feet.  She has hypothyroidism.  On 12/17/14, she underwent the following surgical procedure:  Redo median sternotomy, resection of right ventricular muscle bundles, pulmonary valve replacement with a 23 mm Trifecta valve and augmentation of the main pulmonary artery.  The surgery was complicated by adherence of the heart to the sternum, making dissection difficult.  They accessed the RFA for bypass, but were not able to get in the vein (chronic occlusion).  She ultimately did quite well, however, and was discharged home after 4 days.      She does choke on her food.  This was a bad problem as a child and got better, but continues to be a problem at times.  At the time,  they were told a nerve was injured during heart surgery.  A few years ago, I had her seen in ENT clinic.  The conclusions from that visit were as follows:  In summary, this patient has normal vocal cord motion bilaterally, ruling out any functional impairment from a paralyzed vocal cord given her remote history of TOF surgery. She does have a small glottic gap and this may contribute to her hoarseness and intermittent throat clearing with PO liquid. She tolerated PO liquid in the exam room without incident. It is likely that she is experiencing intermittent bouts of laryngeal penetration, not aspiration given her clinical picture. Discussed the only way to know for sure is with a MBSS. However, given the rarity of these episodes(Once every 1-2 weeks), it is not likely that we will catch any abnormality on that test. I have discussed a course of observation, as there is no immediate concern for aspiration. Should her symptoms of throat clearing with PO liquid intake increase in nature, we can then proceed with a MBSS to further characterize the nature of her dysphagia.     Several years ago, Jocelin was admitted to the hospital for evaluation after Holter demonstrated runs of ventricular tachycardia. During this admission, she had electrophysiology study which demonstrated no inducible ventricular arrhythmia, and she was started on nadolol.     The review of systems is as noted above. It is otherwise negative for other symptoms related to the general, neurological, psychiatric, endocrine, gastrointestinal, genitourinary, respiratory, dermatologic, musculoskeletal, hematologic, and immunologic systems.     Current Outpatient Medications on File Prior to Visit   Medication Sig Dispense Refill    aspirin (ECOTRIN) 81 MG EC tablet Take 81 mg by mouth once daily.      hydroCHLOROthiazide (HYDRODIURIL) 25 MG tablet Take 1 tablet (25 mg total) by mouth once daily. 30 tablet 11    ibuprofen (ADVIL,MOTRIN) 800 MG tablet  "Take 800 mg by mouth every 8 (eight) hours as needed for Pain.      levothyroxine (SYNTHROID) 50 MCG tablet       losartan (COZAAR) 50 MG tablet Take 1 tablet (50 mg total) by mouth 2 (two) times daily. 60 tablet 11    nadolol (CORGARD) 40 MG tablet take 1 tablet by mouth once a day 30 tablet 11    norethindrone (MICRONOR) 0.35 mg tablet Take 1 tablet by mouth once daily.  11    ranitidine (ZANTAC) 150 MG tablet Take 150 mg by mouth once daily.       No current facility-administered medications on file prior to visit.    Review of patient's allergies indicates:  No Known Allergies  She is no longer taking the oxycodone.  IMMUNIZATIONS:UTD  SOCIAL: Currently lives with parents. She does not smoke. She will have an occasional alcoholic beverage.  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      Vitals:    03/11/19 0847   BP: 112/70  Comment: Right Arm, Sitting   Pulse: 64   Resp: 16   SpO2: 96%   Weight: 69.9 kg (154 lb)   Height: 5' 2.21" (1.58 m)     GENERAL: Jocelin is a well-developed well-nourished female. She moves symmetrically. She does have a stutter.   HEENT: No dysmorphic features are noted. Visual tracking is normal with symmetric grimace. Teeth are in good repair. No lymphadenopathy is noted.    CHEST: There is a well healed median sternotomy scar and a well healed left thoracotomy scar.  CARDIAC: The precordium is quiet. S1 is single with a split S2. There is a grade 1/6 systolic ejection murmur and a 1-2/6 soft, low pitched diastolic murmur at the RUSB.  ABDOMEN: The abdomen is soft with no hepatosplenomegaly.  EXTREMITIES: Extremities are warm and well perfused. Pulses are good in all extremities with no edema clubbing or cyanosis noted.    I personally reviewed the following tests performed today and my interpretation follows:  No tests performed in clinic today.    Echo 10/8/18 reveals:  Very limited acoustic windows:.  Limited imaging suggests no obvious enlargement of the right ventricle " and  qualitatively good systolic function.  Normal left ventricle structure and size.  Normal size aorta.  Bioprosthetic valve in pulmonary position not well visualized. No evidence of  stenosis or significant insufficiency.  Aortic valve poorly visualized. No stenosis. Likely trivial insufficiency.  LPA well visualized with no stenosis. RPA not as well visualized.    CPX testing 2016:  4) The PkVO2 was 20.6 ml/kg/min which is 55% of predicted equating to a functional capacity of 5.9 METS indicating moderate functional impairment.   CONCLUSIONS   CPX Conclusions:  Moderate functional impairment associated with a normal breathing reserve, normal oxygen saturation, an adequate effort, and a borderline reduced AT. These findings are indicative of functional impairment secondary to circulatory insufficiency.     Cardiac MRI 12/2016:  Conclusion:   25 yo with tetralogy of Fallot s/p repair, s/p pulmonary valve replacement.   1. The right ventricle end diastolic volume is top normal, end systolic volume is increased. (RVEDV 110 cc/m2 for BSA, RVESV 65 cc/m2 for BSA).  RVEF 41%.  2. Normal left ventricular size with LVEF 60 %.  3. There is a pulmonary valve prosthesis. The RVOT was difficult to visualize due to artifact from sternal wires. Pulmonary insufficiency, regurgitant fraction 14%. Peak velocity 1.7 m/sec.  4. Normal size of main and RPA, mild LPA hypoplasia.   5. Aortic insufficiency, regurgitant fraction 11%, regurgitant volume 7 cc.   6. Dilated ascending aorta, meaurements as above.   7. Right atrial enlargement.   8. Tricuspid regurgitation noted.     Sincerely,    Mazin Catalan MD  Pediatric Cardiology  Adult Congenital Heart Disease  [facility] [address] [place] [address]  [phone]

## 2019-03-15 ENCOUNTER — TELEPHONE (OUTPATIENT)
Dept: PEDIATRIC CARDIOLOGY | Facility: CLINIC | Age: 29
End: 2019-03-15

## 2019-03-15 NOTE — TELEPHONE ENCOUNTER
Spoke with Chelsy, faxed last clinic note, ekg, echo over to the number below.    ----- Message from Valentine Linares sent at 3/15/2019  2:31 PM CDT -----  Needs Advice    Reason for call:--Resent clinic note,labs,EKG and echo results--        Communication Preference:--Chelsy--[phone]--    Additional Information:Chelsy calling to see if she can get the information listed above fax to her. Please fax to  [phone].

## 2019-03-27 RX ORDER — NADOLOL 40 MG/1
TABLET ORAL
Qty: 30 TABLET | Refills: 11 | Status: SHIPPED | OUTPATIENT
Start: 2019-03-27 | End: 2020-01-21

## 2019-04-08 ENCOUNTER — TELEPHONE (OUTPATIENT)
Dept: PEDIATRIC CARDIOLOGY | Facility: CLINIC | Age: 29
End: 2019-04-08

## 2019-04-08 NOTE — TELEPHONE ENCOUNTER
Patient received letter in the mail regarding recall on Losartan.  Mother unsure if her pills are part of the lot numbers recalled.  Suggested she contact their pharmacy.  They will have lot info and know what to do if it is recalled.

## 2019-04-08 NOTE — TELEPHONE ENCOUNTER
----- Message from Kamilla Jacques sent at 4/8/2019  8:48 AM CDT -----  Contact: Desiree Bowers [phone]  Needs Advice    Reason for call: Recalled Rx        Communication Preference: Desiree Bowers [phone]    Additional Information: Pt Rx Losartan is currently on recall and pt needs refill mom is calling to find out what should be done.

## 2019-08-22 ENCOUNTER — HISTORICAL (OUTPATIENT)
Dept: ADMINISTRATIVE | Facility: HOSPITAL | Age: 29
End: 2019-08-22

## 2019-08-22 LAB
ALBUMIN SERPL-MCNC: 5 G/DL (ref 3.5–5.5)
ALBUMIN/GLOB SERPL: 2.1 {RATIO} (ref 1.2–2.2)
ALP SERPL-CCNC: 47 IU/L (ref 39–117)
ALT SERPL-CCNC: 10 IU/L (ref 0–32)
AST SERPL-CCNC: 20 IU/L (ref 0–40)
BASOPHILS # BLD AUTO: 0 X10E3/UL (ref 0–0.2)
BASOPHILS NFR BLD AUTO: 0 %
BILIRUB SERPL-MCNC: 0.6 MG/DL (ref 0–1.2)
BUN SERPL-MCNC: 11 MG/DL (ref 6–20)
CALCIUM SERPL-MCNC: 9.7 MG/DL (ref 8.7–10.2)
CHLORIDE SERPL-SCNC: 100 MMOL/L (ref 96–106)
CHOLEST SERPL-MCNC: 182 MG/DL (ref 100–199)
CHOLEST/HDLC SERPL: 5.5 RATIO (ref 0–4.4)
CO2 SERPL-SCNC: 23 MMOL/L (ref 20–29)
CREAT SERPL-MCNC: 0.78 MG/DL (ref 0.57–1)
CREAT/UREA NIT SERPL: 14 (ref 9–23)
EOSINOPHIL # BLD AUTO: 0 X10E3/UL (ref 0–0.4)
EOSINOPHIL NFR BLD AUTO: 1 %
ERYTHROCYTE [DISTWIDTH] IN BLOOD BY AUTOMATED COUNT: 13.1 % (ref 12.3–15.4)
GLOBULIN SER-MCNC: 2.4 G/DL (ref 1.5–4.5)
GLUCOSE SERPL-MCNC: 84 MG/DL (ref 65–99)
HCT VFR BLD AUTO: 43.1 % (ref 34–46.6)
HDLC SERPL-MCNC: 33 MG/DL
HGB BLD-MCNC: 14.7 G/DL (ref 11.1–15.9)
LDLC SERPL CALC-MCNC: 127 MG/DL (ref 0–99)
LYMPHOCYTES # BLD AUTO: 1.4 X10E3/UL (ref 0.7–3.1)
LYMPHOCYTES NFR BLD AUTO: 19 %
MAGNESIUM SERPL-MCNC: 2.2 MG/DL (ref 1.6–2.3)
MCH RBC QN AUTO: 30.4 PG (ref 26.6–33)
MCHC RBC AUTO-ENTMCNC: 34.1 G/DL (ref 31.5–35.7)
MCV RBC AUTO: 89 FL (ref 79–97)
MONOCYTES # BLD AUTO: 0.6 X10E3/UL (ref 0.1–0.9)
MONOCYTES NFR BLD AUTO: 8 %
NEUTROPHILS # BLD AUTO: 5.3 X10E3/UL (ref 1.4–7)
NEUTROPHILS NFR BLD AUTO: 72 %
PLATELET # BLD AUTO: 160 X10E3/UL (ref 150–450)
POTASSIUM SERPL-SCNC: 3.7 MMOL/L (ref 3.5–5.2)
PROT SERPL-MCNC: 7.4 G/DL (ref 6–8.5)
RBC # BLD AUTO: 4.84 X10(6)/MCL (ref 3.77–5.28)
SODIUM SERPL-SCNC: 141 MMOL/L (ref 134–144)
TRIGL SERPL-MCNC: 110 MG/DL (ref 0–149)
TSH SERPL-ACNC: 1.96 MIU/ML (ref 0.45–4.5)
VLDLC SERPL CALC-MCNC: 22 MG/DL (ref 5–40)
WBC # SPEC AUTO: 7.4 X10E3/UL (ref 3.4–10.8)

## 2019-09-05 DIAGNOSIS — Z87.74 TETRALOGY OF FALLOT S/P REPAIR: ICD-10-CM

## 2019-09-05 DIAGNOSIS — Z95.3 S/P PULMONARY VALVE REPLACEMENT WITH BIOPROSTHETIC VALVE: Primary | ICD-10-CM

## 2019-09-09 ENCOUNTER — CLINICAL SUPPORT (OUTPATIENT)
Dept: PEDIATRIC CARDIOLOGY | Facility: CLINIC | Age: 29
End: 2019-09-09
Payer: COMMERCIAL

## 2019-09-09 ENCOUNTER — OFFICE VISIT (OUTPATIENT)
Dept: PEDIATRIC CARDIOLOGY | Facility: CLINIC | Age: 29
End: 2019-09-09
Payer: COMMERCIAL

## 2019-09-09 VITALS
OXYGEN SATURATION: 99 % | HEIGHT: 62 IN | BODY MASS INDEX: 29.42 KG/M2 | SYSTOLIC BLOOD PRESSURE: 132 MMHG | RESPIRATION RATE: 18 BRPM | DIASTOLIC BLOOD PRESSURE: 81 MMHG | WEIGHT: 159.88 LBS | HEART RATE: 57 BPM

## 2019-09-09 DIAGNOSIS — Q24.9 ADULT CONGENITAL HEART DISEASE: ICD-10-CM

## 2019-09-09 DIAGNOSIS — Z87.74 TETRALOGY OF FALLOT S/P REPAIR: Primary | ICD-10-CM

## 2019-09-09 DIAGNOSIS — Z98.890 PERSONAL HISTORY OF SURGERY TO HEART AND GREAT VESSELS, PRESENTING HAZARDS TO HEALTH: ICD-10-CM

## 2019-09-09 DIAGNOSIS — Q24.9 CONGENITAL HEART DISEASE: ICD-10-CM

## 2019-09-09 DIAGNOSIS — Z87.74 TETRALOGY OF FALLOT S/P REPAIR: ICD-10-CM

## 2019-09-09 DIAGNOSIS — Z95.3 S/P PULMONARY VALVE REPLACEMENT WITH BIOPROSTHETIC VALVE: ICD-10-CM

## 2019-09-09 DIAGNOSIS — I10 ESSENTIAL HYPERTENSION: ICD-10-CM

## 2019-09-09 PROCEDURE — 3008F BODY MASS INDEX DOCD: CPT | Mod: CPTII,S$GLB,, | Performed by: PEDIATRICS

## 2019-09-09 PROCEDURE — 99214 PR OFFICE/OUTPT VISIT, EST, LEVL IV, 30-39 MIN: ICD-10-PCS | Mod: 25,S$GLB,, | Performed by: PEDIATRICS

## 2019-09-09 PROCEDURE — 99214 OFFICE O/P EST MOD 30 MIN: CPT | Mod: 25,S$GLB,, | Performed by: PEDIATRICS

## 2019-09-09 PROCEDURE — 3079F DIAST BP 80-89 MM HG: CPT | Mod: CPTII,S$GLB,, | Performed by: PEDIATRICS

## 2019-09-09 PROCEDURE — 93005 ELECTROCARDIOGRAM TRACING: CPT | Mod: S$GLB,,, | Performed by: PEDIATRICS

## 2019-09-09 PROCEDURE — 3075F PR MOST RECENT SYSTOLIC BLOOD PRESS GE 130-139MM HG: ICD-10-PCS | Mod: CPTII,S$GLB,, | Performed by: PEDIATRICS

## 2019-09-09 PROCEDURE — 3075F SYST BP GE 130 - 139MM HG: CPT | Mod: CPTII,S$GLB,, | Performed by: PEDIATRICS

## 2019-09-09 PROCEDURE — 93005 EKG 12-LEAD PEDIATRIC: ICD-10-PCS | Mod: S$GLB,,, | Performed by: PEDIATRICS

## 2019-09-09 PROCEDURE — 93010 EKG 12-LEAD PEDIATRIC: ICD-10-PCS | Mod: S$GLB,,, | Performed by: PEDIATRICS

## 2019-09-09 PROCEDURE — 3079F PR MOST RECENT DIASTOLIC BLOOD PRESSURE 80-89 MM HG: ICD-10-PCS | Mod: CPTII,S$GLB,, | Performed by: PEDIATRICS

## 2019-09-09 PROCEDURE — 3008F PR BODY MASS INDEX (BMI) DOCUMENTED: ICD-10-PCS | Mod: CPTII,S$GLB,, | Performed by: PEDIATRICS

## 2019-09-09 PROCEDURE — 93010 ELECTROCARDIOGRAM REPORT: CPT | Mod: S$GLB,,, | Performed by: PEDIATRICS

## 2019-09-09 NOTE — PROGRESS NOTES
09/09/2019    RE:ALAN LONGO  Clinic# [id number]    Colleen Hull MD   [address] [facility] [address]     Dr. Nahid Junior  Oral Surgeon  [facility]    [facility]    Dear Doctors:    I had the pleasure of seeing Alan in our adult congenital cardiology clinic in [place]. Alan Longo is a 29 y.o. female with repaired tetralogy of Fallot.  To summarize, her diagnoses are as follows:   Diagnoses:  1. Tetralogy of Fallot status post repair.  2. 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 Palpitations with a history of ventricular arrhythmia on Holter monitor and a negative EP study.  Palpitations much improved.  4. Mild AI  5. Choking on food - chronic issue.  No vocal cord dysfunction noted on ENT evaluation, but she does have a mild glottic gap.  6. Consider 22q11 deletion.  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 Significant hypertension.  Not due to her congenital heart disease.  Much improved.  9. Very difficult echo imaging.    Discussion:  1. Good dental hygiene.  She definitely needs antibiotic prophylaxis before dental work.  2. continue current doses of HCTZ, losartan, nadalol, aspirin.    3. follow up in 1 year in [place] with cardiac MRI, holter, EKG.  4. Healthy, low salt diet.  Regular low intensity exercise.  5. Contact me with any issues.  Any neuro changes, shortness of breath, edema, etc... should prompt immediate evaluation.    Interval history:    I last saw her about 6 months ago.  She has done very well since that time.  Her palpitations are much improved.  She denies chest pain, shortness of breath, syncope, near syncope, cyanosis, and edema.  Her blood pressure at home tends to run in the 120s systolic although it was elevated in the  140s last night.    Her primary care physician recently sarah blood work on August 22, 2019.  I reviewed those studies.  Total cholesterol 182.  HDL 33, .  Triglycerides 110.  TSH 1.96.  Her CBC was completely normal.  Hemoglobin 14.7.  A urinalysis was normal.  Sodium 141, potassium 3.7, chloride 100, CO2 23, BUN 11, creatinine 0.8.  Calcium 9.7.  Albumin 5.  Magnesium 2.2.  Bilirubin 0.6.  AST 20, ALT 10.    10/8/18.  She had a renal US and abdominal CTA that showed no evidence of kidney disease or renal artery stenosis, and her creatinine and thyroid studies were normal.      She has problems with both feet.  She has hypothyroidism.  On 12/17/14, she underwent the following surgical procedure:  Redo median sternotomy, resection of right ventricular muscle bundles, pulmonary valve replacement with a 23 mm Trifecta valve and augmentation of the main pulmonary artery.  The surgery was complicated by adherence of the heart to the sternum, making dissection difficult.  They accessed the RFA for bypass, but were not able to get in the vein (chronic occlusion).  She ultimately did quite well, however, and was discharged home after 4 days.      She does choke on her food.  This was a bad problem as a child and got better, but continues to be a problem at times.  At the time, they were told a nerve was injured during heart surgery.  A few years ago, I had her seen in ENT clinic.  The conclusions from that visit were as follows:  In summary, this patient has normal vocal cord motion bilaterally, ruling out any functional impairment from a paralyzed vocal cord given her remote history of TOF surgery. She does have a small glottic gap and this may contribute to her hoarseness and intermittent throat clearing with PO liquid. She tolerated PO liquid in the exam room without incident. It is likely that she is experiencing intermittent bouts of laryngeal penetration, not aspiration given her clinical picture. Discussed the  only way to know for sure is with a MBSS. However, given the rarity of these episodes(Once every 1-2 weeks), it is not likely that we will catch any abnormality on that test. I have discussed a course of observation, as there is no immediate concern for aspiration. Should her symptoms of throat clearing with PO liquid intake increase in nature, we can then proceed with a MBSS to further characterize the nature of her dysphagia.     Several years ago, Jocelin was admitted to the hospital for evaluation after Holter demonstrated runs of ventricular tachycardia. During this admission, she had electrophysiology study which demonstrated no inducible ventricular arrhythmia, and she was started on nadolol.     The review of systems is as noted above. It is otherwise negative for other symptoms related to the general, neurological, psychiatric, endocrine, gastrointestinal, genitourinary, respiratory, dermatologic, musculoskeletal, hematologic, and immunologic systems.     Current Outpatient Medications on File Prior to Visit   Medication Sig Dispense Refill    aspirin (ECOTRIN) 81 MG EC tablet Take 81 mg by mouth once daily.      hydroCHLOROthiazide (HYDRODIURIL) 25 MG tablet Take 1 tablet (25 mg total) by mouth once daily. 30 tablet 11    ibuprofen (ADVIL,MOTRIN) 800 MG tablet Take 800 mg by mouth every 8 (eight) hours as needed for Pain.      levothyroxine (SYNTHROID) 50 MCG tablet       losartan (COZAAR) 50 MG tablet Take 1 tablet (50 mg total) by mouth 2 (two) times daily. 60 tablet 11    nadolol (CORGARD) 40 MG tablet TAKE ONE TABLET BY MOUTH DAILY 30 tablet 11    norethindrone (MICRONOR) 0.35 mg tablet Take 1 tablet by mouth once daily.  11    ranitidine (ZANTAC) 150 MG tablet Take 150 mg by mouth once daily.       No current facility-administered medications on file prior to visit.    Review of patient's allergies indicates:  No Known Allergies  She is no longer taking the  "oxycodone.  IMMUNIZATIONS:UTD  SOCIAL: Currently lives with parents. She does not smoke. She will have an occasional alcoholic beverage.  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      Vitals:    09/09/19 0835 09/09/19 0842   BP: 136/75 132/81   BP Location: Right arm Left arm   Patient Position: Sitting Sitting   BP Method: Medium (Automatic) Medium (Automatic)   Pulse: (!) 54 (!) 57   Resp: 18    SpO2: 99%    Weight: 72.5 kg (159 lb 14.4 oz)    Height: 5' 2.21" (1.58 m)      GENERAL: Jocelin is a well-developed well-nourished female. She moves symmetrically. She does have a stutter.   HEENT: No dysmorphic features are noted. Visual tracking is normal with symmetric grimace. Teeth are in good repair. No lymphadenopathy is noted.    CHEST: There is a well healed median sternotomy scar and a well healed left thoracotomy scar.  CARDIAC: The precordium is quiet. S1 is single with a split S2. There is a grade 1/6 systolic ejection murmur and a 1-2/6 soft, low pitched diastolic murmur at the RUSB.  ABDOMEN: The abdomen is soft with no hepatosplenomegaly.  EXTREMITIES: Extremities are warm and well perfused. Pulses are good in all extremities with no edema clubbing or cyanosis noted.    I personally reviewed the following tests performed today and my interpretation follows:  An EKG performed in clinic today reveals sinus bradycardia with a rate of 53.  A right bundle branch block is present.  There is no ectopy.    An echocardiogram was performed.  This study is of poor quality as her acoustic windows are quite limited.  Her left ventricle is normal in size, and the ejection fraction is normal.  There may be some diastolic dysfunction.  There is no significant mitral insufficiency.  There is no aortic stenosis.  There is likely trivial aortic valve insufficiency.  Her aortic root is mildly enlarged.  The right ventricle is not well imaged.  The pulmonary valve is not imaged.  There is mild acceleration of flow at 2 " m/sec in the left pulmonary artery.    CPX testing 2016:  4) The PkVO2 was 20.6 ml/kg/min which is 55% of predicted equating to a functional capacity of 5.9 METS indicating moderate functional impairment.   CONCLUSIONS   CPX Conclusions:  Moderate functional impairment associated with a normal breathing reserve, normal oxygen saturation, an adequate effort, and a borderline reduced AT. These findings are indicative of functional impairment secondary to circulatory insufficiency.     Cardiac MRI 12/2016:  Conclusion:   25 yo with tetralogy of Fallot s/p repair, s/p pulmonary valve replacement.   1. The right ventricle end diastolic volume is top normal, end systolic volume is increased. (RVEDV 110 cc/m2 for BSA, RVESV 65 cc/m2 for BSA).  RVEF 41%.  2. Normal left ventricular size with LVEF 60 %.  3. There is a pulmonary valve prosthesis. The RVOT was difficult to visualize due to artifact from sternal wires. Pulmonary insufficiency, regurgitant fraction 14%. Peak velocity 1.7 m/sec.  4. Normal size of main and RPA, mild LPA hypoplasia.   5. Aortic insufficiency, regurgitant fraction 11%, regurgitant volume 7 cc.   6. Dilated ascending aorta, meaurements as above.   7. Right atrial enlargement.   8. Tricuspid regurgitation noted.     Sincerely,    Mazin Catalan MD  Pediatric Cardiology  Adult Congenital Heart Disease  [facility]  [address] [place] [address]  [phone]

## 2019-09-09 NOTE — LETTER
September 9, 2019      Sharmaine Jones MD  [address] [place] [address] [place] [address]           [facility] - Pediatric [facility]iology  [address] [place] [address]  [place] [address]  Phone: [phone]  Fax: [phone]          Patient: Jocelin Longo   MR Number: [id number]   YOB: 1990   Date of Visit: 9/9/2019       Dear Dr. Sharmaine Jones:    Thank you for referring Jocelin Longo to me for evaluation. Attached you will find relevant portions of my assessment and plan of care.    If you have questions, please do not hesitate to call me. I look forward to following Jocelin Longo along with you.    Sincerely,    Mazin Catalan MD    Enclosure  CC:  No Recipients    If you would like to receive this communication electronically, please contact [email or web address] or [phone] to request more information on [employer] Link access.    For providers and/or their staff who would like to refer a patient to [facility], please contact us through our one-stop-shop provider referral line, [facility], at [phone].    If you feel you have received this communication in error or would no longer like to receive these types of communications, please e-mail [email or web address]

## 2019-10-14 RX ORDER — LOSARTAN POTASSIUM 50 MG/1
TABLET ORAL
Qty: 60 TABLET | Refills: 11 | Status: SHIPPED | OUTPATIENT
Start: 2019-10-14 | End: 2020-10-28

## 2019-12-02 RX ORDER — HYDROCHLOROTHIAZIDE 25 MG/1
TABLET ORAL
Qty: 30 TABLET | Refills: 11 | Status: SHIPPED | OUTPATIENT
Start: 2019-12-02 | End: 2020-12-22

## 2020-01-15 ENCOUNTER — PATIENT MESSAGE (OUTPATIENT)
Dept: PEDIATRIC CARDIOLOGY | Facility: CLINIC | Age: 30
End: 2020-01-15

## 2020-01-21 ENCOUNTER — TELEPHONE (OUTPATIENT)
Dept: PEDIATRIC CARDIOLOGY | Facility: CLINIC | Age: 30
End: 2020-01-21

## 2020-01-21 RX ORDER — METOPROLOL SUCCINATE 50 MG/1
50 TABLET, EXTENDED RELEASE ORAL DAILY
Qty: 30 TABLET | Refills: 11 | Status: SHIPPED | OUTPATIENT
Start: 2020-01-21 | End: 2020-12-07

## 2020-01-21 NOTE — PROGRESS NOTES
Her insurance no longer covers Nadolol.  Will switch to the equivalent dose of metoprolol, 50 mg daily.

## 2020-01-21 NOTE — TELEPHONE ENCOUNTER
Insurance denied prior authorization for Nadolol. Will contact the insurance company and see which meds are included in Jocelin's plan. Mom verbalized understanding all information provided.   ----- Message from Eber Dey sent at 1/21/2020  3:23 PM CST -----  Contact: Mom- [phone]  Prescription refill request-  RX name and strength: nadolol (CORGARD) 40 MG tablet     Directions:  TAKE ONE TABLET BY MOUTH DAILY    Is this a 30 day or 90 day RX:  30 day    Local pharmacy or mail order pharmacy:  [facility]    Pharmacy name and phone #:   [address]   [phone] (Phone)  [phone] (Fax)    Additional Information: Mom states the medication now needs a prior authorization & the patient only has 3 pills left.

## 2020-01-22 ENCOUNTER — TELEPHONE (OUTPATIENT)
Dept: PEDIATRIC CARDIOLOGY | Facility: CLINIC | Age: 30
End: 2020-01-22

## 2020-01-22 NOTE — TELEPHONE ENCOUNTER
Explained to mom that its the correct medication. She verbalized understanding all information provided   ----- Message from Valentine Linares sent at 1/22/2020  4:35 PM CST -----  Needs Advice    Reason for call:--Medication?--        Communication Preference:--Elissa--[phone]--    Additional Information:Mom states that the new medication that pt on is for high blood pressure. She would like to know if this is the correct medication pt needs for her irregular  heart rhythm. Per mom would like to know before everyone leaves because pt has no more medication. Please call to advise.

## 2020-03-16 ENCOUNTER — PATIENT MESSAGE (OUTPATIENT)
Dept: PEDIATRIC CARDIOLOGY | Facility: CLINIC | Age: 30
End: 2020-03-16

## 2020-05-12 ENCOUNTER — PATIENT MESSAGE (OUTPATIENT)
Dept: PEDIATRIC CARDIOLOGY | Facility: CLINIC | Age: 30
End: 2020-05-12

## 2020-07-22 ENCOUNTER — TELEPHONE (OUTPATIENT)
Dept: PEDIATRIC CARDIOLOGY | Facility: CLINIC | Age: 30
End: 2020-07-22

## 2020-07-22 DIAGNOSIS — Z87.74 TETRALOGY OF FALLOT S/P REPAIR: Primary | ICD-10-CM

## 2020-07-22 DIAGNOSIS — Z95.3 S/P PULMONARY VALVE REPLACEMENT WITH BIOPROSTHETIC VALVE: ICD-10-CM

## 2020-07-22 DIAGNOSIS — Q24.9 CONGENITAL MALFORMATION OF HEART, UNSPECIFIED: ICD-10-CM

## 2020-07-22 DIAGNOSIS — Z98.890 PERSONAL HISTORY OF SURGERY TO HEART AND GREAT VESSELS, PRESENTING HAZARDS TO HEALTH: ICD-10-CM

## 2020-07-22 DIAGNOSIS — Q24.9 ADULT CONGENITAL HEART DISEASE: ICD-10-CM

## 2020-07-22 NOTE — TELEPHONE ENCOUNTER
Scheduled appointment for Sept 23 start time 12pm. Appointment slip mailed to confirmed address on file. Mom verbalized understanding all information provided.   ----- Message from Merary Bates sent at 7/22/2020  4:13 PM CDT -----  Type:  Needs Medical Advice    Who Called: mom       Would the patient rather a call back or a response via [facility]? Call back     Best Call Back Number: [phone]    Additional Information: mom would like to schedule an appt

## 2020-08-26 ENCOUNTER — TELEPHONE (OUTPATIENT)
Dept: PEDIATRIC CARDIOLOGY | Facility: CLINIC | Age: 30
End: 2020-08-26

## 2020-08-26 NOTE — TELEPHONE ENCOUNTER
Moved cardiology appointment to Oct 1, appointment slip mailed to confirmed address on file. Mom verbalized understanding all information provided   ----- Message from Ml Ledesma sent at 8/26/2020 12:39 PM CDT -----  Contact: ronak Malone   Mom is returning a call to Chelsy about charline an MRI,Holter & doctor appt.

## 2020-10-01 ENCOUNTER — HOSPITAL ENCOUNTER (OUTPATIENT)
Dept: RADIOLOGY | Facility: HOSPITAL | Age: 30
Discharge: HOME | End: 2020-10-01
Attending: PEDIATRICS
Payer: COMMERCIAL

## 2020-10-01 ENCOUNTER — OFFICE VISIT (OUTPATIENT)
Dept: CARDIOLOGY | Facility: CLINIC | Age: 30
End: 2020-10-01
Payer: COMMERCIAL

## 2020-10-01 ENCOUNTER — HOSPITAL ENCOUNTER (OUTPATIENT)
Dept: CARDIOLOGY | Facility: CLINIC | Age: 30
Discharge: HOME | End: 2020-10-01
Payer: COMMERCIAL

## 2020-10-01 ENCOUNTER — HOSPITAL ENCOUNTER (OUTPATIENT)
Dept: PEDIATRIC CARDIOLOGY | Facility: HOSPITAL | Age: 30
Discharge: HOME | End: 2020-10-01
Attending: PEDIATRICS
Payer: COMMERCIAL

## 2020-10-01 VITALS
OXYGEN SATURATION: 98 % | SYSTOLIC BLOOD PRESSURE: 127 MMHG | HEART RATE: 63 BPM | DIASTOLIC BLOOD PRESSURE: 75 MMHG | BODY MASS INDEX: 29.57 KG/M2 | WEIGHT: 160.69 LBS | HEIGHT: 62 IN

## 2020-10-01 DIAGNOSIS — Z95.3 S/P PULMONARY VALVE REPLACEMENT WITH BIOPROSTHETIC VALVE: ICD-10-CM

## 2020-10-01 DIAGNOSIS — Q24.9 CONGENITAL MALFORMATION OF HEART, UNSPECIFIED: ICD-10-CM

## 2020-10-01 DIAGNOSIS — Z98.890 PERSONAL HISTORY OF SURGERY TO HEART AND GREAT VESSELS, PRESENTING HAZARDS TO HEALTH: ICD-10-CM

## 2020-10-01 DIAGNOSIS — Q24.9 ADULT CONGENITAL HEART DISEASE: ICD-10-CM

## 2020-10-01 DIAGNOSIS — Z87.74 TETRALOGY OF FALLOT S/P REPAIR: ICD-10-CM

## 2020-10-01 DIAGNOSIS — I47.20 VENTRICULAR TACHYCARDIA: ICD-10-CM

## 2020-10-01 DIAGNOSIS — I10 ESSENTIAL HYPERTENSION: ICD-10-CM

## 2020-10-01 DIAGNOSIS — I47.20 VENTRICULAR TACHYCARDIA: Primary | ICD-10-CM

## 2020-10-01 PROCEDURE — 93227: ICD-10-PCS | Mod: ,,, | Performed by: PEDIATRICS

## 2020-10-01 PROCEDURE — 99999 PR PBB SHADOW E&M-EST. PATIENT-LVL IV: CPT | Mod: PBBFAC,,, | Performed by: PEDIATRICS

## 2020-10-01 PROCEDURE — 3078F DIAST BP <80 MM HG: CPT | Mod: CPTII,S$GLB,, | Performed by: PEDIATRICS

## 2020-10-01 PROCEDURE — 93005 ELECTROCARDIOGRAM TRACING: CPT | Mod: S$GLB,,, | Performed by: PEDIATRICS

## 2020-10-01 PROCEDURE — 99214 OFFICE O/P EST MOD 30 MIN: CPT | Mod: 25,S$GLB,, | Performed by: PEDIATRICS

## 2020-10-01 PROCEDURE — 3074F PR MOST RECENT SYSTOLIC BLOOD PRESSURE < 130 MM HG: ICD-10-PCS | Mod: CPTII,S$GLB,, | Performed by: PEDIATRICS

## 2020-10-01 PROCEDURE — 93010 ELECTROCARDIOGRAM REPORT: CPT | Mod: S$GLB,,, | Performed by: INTERNAL MEDICINE

## 2020-10-01 PROCEDURE — 3008F BODY MASS INDEX DOCD: CPT | Mod: CPTII,S$GLB,, | Performed by: PEDIATRICS

## 2020-10-01 PROCEDURE — 3078F PR MOST RECENT DIASTOLIC BLOOD PRESSURE < 80 MM HG: ICD-10-PCS | Mod: CPTII,S$GLB,, | Performed by: PEDIATRICS

## 2020-10-01 PROCEDURE — 93227 XTRNL ECG REC<48 HR R&I: CPT | Mod: ,,, | Performed by: PEDIATRICS

## 2020-10-01 PROCEDURE — 93005 EKG 12-LEAD: ICD-10-PCS | Mod: S$GLB,,, | Performed by: PEDIATRICS

## 2020-10-01 PROCEDURE — 3008F PR BODY MASS INDEX (BMI) DOCUMENTED: ICD-10-PCS | Mod: CPTII,S$GLB,, | Performed by: PEDIATRICS

## 2020-10-01 PROCEDURE — 75557 CARDIAC MRI FOR MORPH: CPT | Mod: 26,,, | Performed by: RADIOLOGY

## 2020-10-01 PROCEDURE — 99999 PR PBB SHADOW E&M-EST. PATIENT-LVL IV: ICD-10-PCS | Mod: PBBFAC,,, | Performed by: PEDIATRICS

## 2020-10-01 PROCEDURE — 99214 PR OFFICE/OUTPT VISIT, EST, LEVL IV, 30-39 MIN: ICD-10-PCS | Mod: 25,S$GLB,, | Performed by: PEDIATRICS

## 2020-10-01 PROCEDURE — 3074F SYST BP LT 130 MM HG: CPT | Mod: CPTII,S$GLB,, | Performed by: PEDIATRICS

## 2020-10-01 PROCEDURE — 93225 XTRNL ECG REC<48 HRS REC: CPT

## 2020-10-01 PROCEDURE — 75557 MRI CARDIAC WO CONTRAST: ICD-10-PCS | Mod: 26,,, | Performed by: RADIOLOGY

## 2020-10-01 PROCEDURE — 93010 EKG 12-LEAD: ICD-10-PCS | Mod: S$GLB,,, | Performed by: INTERNAL MEDICINE

## 2020-10-01 PROCEDURE — 75557 CARDIAC MRI FOR MORPH: CPT | Mod: TC

## 2020-10-01 RX ORDER — SERTRALINE HYDROCHLORIDE 25 MG/1
25 TABLET, FILM COATED ORAL DAILY
COMMUNITY
Start: 2020-09-08 | End: 2023-02-10

## 2020-10-01 NOTE — PROGRESS NOTES
10/01/2020    RE:ALAN LONGO  Clinic# [id number]    Colleen Hull MD   [address] [facility] [address]     Dr. Nahid Junior  Oral Surgeon  [facility]    Dear Doctors:    I had the pleasure of seeing Alan in our adult congenital cardiology clinic in [place]. Alan Longo is a 30 y.o. female with repaired tetralogy of Fallot.  To summarize, her diagnoses are as follows:   Diagnoses:  1. Tetralogy of Fallot status post repair.  Normal biventricular function with minimal RV enlargement.  2. Now s/p resection of right ventricular muscle bundles, pulmonary valve replacement with a 23 mm Trifecta valve and augmentation of the main pulmonary artery 12/17/14   - no significant valve stenosis   - mild pulmonary insufficiency   3. Palpitations with a history of ventricular arrhythmia on Holter monitor and a negative EP study.  Palpitations much improved.  4. Mild aortic insufficiency   5. Choking on food - chronic issue and not worsened.  No vocal cord dysfunction noted on ENT evaluation, but she does have a mild glottic gap.  6. Consider 22q11 deletion.  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 Significant hypertension.  Not due to her congenital heart disease.  Much improved.  9. Very difficult echo imaging.    Discussion:  1. Good dental hygiene.  She definitely needs antibiotic prophylaxis before dental work.  2. continue current medications including aspirin, losartan, metoprolol, hydrochlorothiazide  3. follow up in 1 year in [place] with echo, EKG  4. Healthy, low salt diet.  Regular low intensity exercise.  5. Contact me with any issues.  Any neuro changes, shortness of breath, edema, etc... should prompt immediate evaluation.  6.  They state that she gets regular labs every 6-12 months and will be scheduled for this by December.  From my standpoint, I just need a basic metabolic panel to make sure her  potassium and creatinine are okay.  7.  Holter monitor today    Interval history:    I last saw her about 1 year ago.  Overall, she has done well since that time.  She does feel like she gets tired very easily.  To be clear, this is not shortness of breath but instead fatigue.  Otherwise, no chest pain, palpitations, syncope, near syncope, dyspnea on exertion, cyanosis, or edema.    10/8/18.  She had a renal US and abdominal CTA that showed no evidence of kidney disease or renal artery stenosis, and her creatinine and thyroid studies were normal.      She has problems with both feet.  She has hypothyroidism.  On 12/17/14, she underwent the following surgical procedure:  Redo median sternotomy, resection of right ventricular muscle bundles, pulmonary valve replacement with a 23 mm Trifecta valve and augmentation of the main pulmonary artery.  The surgery was complicated by adherence of the heart to the sternum, making dissection difficult.  They accessed the RFA for bypass, but were not able to get in the vein (chronic occlusion).  She ultimately did quite well, however, and was discharged home after 4 days.      Several years ago, I had her seen in ENT clinic.  The conclusions from that visit were as follows:  In summary, this patient has normal vocal cord motion bilaterally, ruling out any functional impairment from a paralyzed vocal cord given her remote history of TOF surgery. She does have a small glottic gap and this may contribute to her hoarseness and intermittent throat clearing with PO liquid. She tolerated PO liquid in the exam room without incident. It is likely that she is experiencing intermittent bouts of laryngeal penetration, not aspiration given her clinical picture. Discussed the only way to know for sure is with a MBSS. However, given the rarity of these episodes(Once every 1-2 weeks), it is not likely that we will catch any abnormality on that test. I have discussed a course of observation, as  there is no immediate concern for aspiration. Should her symptoms of throat clearing with PO liquid intake increase in nature, we can then proceed with a MBSS to further characterize the nature of her dysphagia.     Several years ago, Jocelin was admitted to the hospital for evaluation after Holter demonstrated runs of ventricular tachycardia. During this admission, she had electrophysiology study which demonstrated no inducible ventricular arrhythmia, and she was started on nadolol.     The review of systems is as noted above. It is otherwise negative for other symptoms related to the general, neurological, psychiatric, endocrine, gastrointestinal, genitourinary, respiratory, dermatologic, musculoskeletal, hematologic, and immunologic systems.     Current Outpatient Medications on File Prior to Visit   Medication Sig Dispense Refill    aspirin (ECOTRIN) 81 MG EC tablet Take 81 mg by mouth once daily.      hydroCHLOROthiazide (HYDRODIURIL) 25 MG tablet TAKE ONE TABLET BY MOUTH DAILY 30 tablet 11    ibuprofen (ADVIL,MOTRIN) 800 MG tablet Take 800 mg by mouth every 8 (eight) hours as needed for Pain.      levothyroxine (SYNTHROID) 50 MCG tablet       losartan (COZAAR) 50 MG tablet TAKE ONE TABLET BY MOUTH TWICE DAILY 60 tablet 11    metoprolol succinate (TOPROL-XL) 50 MG 24 hr tablet Take 1 tablet (50 mg total) by mouth once daily. 30 tablet 11    norethindrone (MICRONOR) 0.35 mg tablet Take 1 tablet by mouth once daily.  11    sertraline (ZOLOFT) 25 MG tablet Take 25 mg by mouth once daily.      ranitidine (ZANTAC) 150 MG tablet Take 150 mg by mouth once daily.       No current facility-administered medications on file prior to visit.    Review of patient's allergies indicates:  No Known Allergies  She is no longer taking the oxycodone.  IMMUNIZATIONS:UTD  SOCIAL: Currently lives with parents. She does not smoke. She will have an occasional alcoholic beverage.  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      Vitals:    10/01/20 1312 10/01/20 1316   BP: 127/78 127/75   BP Location: Right arm Left arm   Patient Position: Sitting Sitting   BP Method: Large (Automatic) Large (Automatic)   Pulse: 63 63   SpO2: 98%    Weight: 72.9 kg (160 lb 11.5 oz)    Height: 5' 2.21" (1.58 m)      GENERAL: Jocelin is a well-developed well-nourished female. She moves symmetrically. She does have a stutter.   HEENT: No dysmorphic features are noted. Visual tracking is normal with symmetric grimace. Teeth are in good repair. No lymphadenopathy is noted.    CHEST: There is a well healed median sternotomy scar and a well healed left thoracotomy scar.  CARDIAC: The precordium is quiet. S1 is single with a split S2. There is a grade 1/6 systolic ejection murmur and a 1-2/6 soft, low pitched diastolic murmur at the RUSB.  ABDOMEN: The abdomen is soft with no hepatosplenomegaly.  EXTREMITIES: Extremities are warm and well perfused. Pulses are good in all extremities with no edema clubbing or cyanosis noted.    I personally reviewed the following tests performed today and my interpretation follows:  An EKG performed in clinic today reveals sinus rhythm with a rate of 60.  A right bundle branch block is present.  There is no ectopy.  EKG is unchanged.    Cardiac MRI October 1, 2020:  1. Top normal, mildly increased right ventricular volumes. (RVEDV 101 cc/m2 for BSA, RVESV 54 cc/m2 for BSA).  Slight decrease in RV volumes for BSA in the setting of increased BSA when compared to MRI 2016. RVEF 47 %.  2. Normal left ventricular volume with LVEF 62 %.  3. RVOT obscured by artifact from the sternum.  4. Well-seated bioprosthetic pulmonary valve. Pulmonary insufficiency with regurgitant fraction of 19% and regurgitation volume of 13 cc. Peak velocity 1.9m/sec.   5. Normal size of main and branch pulmonary arteries.   6. Aortic insufficiency, regurgitant fraction 3%, regurgitant volume 2 cc.   7. Top normal size of the aortic root with mildly " dilated ascending aorta. No change in size of the ascending aorta when compared to MRI 2016.  8. Tricuspid valve regurgitation noted.    CPX testing 2016:  4) The PkVO2 was 20.6 ml/kg/min which is 55% of predicted equating to a functional capacity of 5.9 METS indicating moderate functional impairment.   CONCLUSIONS   CPX Conclusions:  Moderate functional impairment associated with a normal breathing reserve, normal oxygen saturation, an adequate effort, and a borderline reduced AT. These findings are indicative of functional impairment secondary to circulatory insufficiency.     Cardiac MRI 12/2016:  Conclusion:   25 yo with tetralogy of Fallot s/p repair, s/p pulmonary valve replacement.   1. The right ventricle end diastolic volume is top normal, end systolic volume is increased. (RVEDV 110 cc/m2 for BSA, RVESV 65 cc/m2 for BSA).  RVEF 41%.  2. Normal left ventricular size with LVEF 60 %.  3. There is a pulmonary valve prosthesis. The RVOT was difficult to visualize due to artifact from sternal wires. Pulmonary insufficiency, regurgitant fraction 14%. Peak velocity 1.7 m/sec.  4. Normal size of main and RPA, mild LPA hypoplasia.   5. Aortic insufficiency, regurgitant fraction 11%, regurgitant volume 7 cc.   6. Dilated ascending aorta, meaurements as above.   7. Right atrial enlargement.   8. Tricuspid regurgitation noted.     Sincerely,    Mazin Catalan MD  Pediatric Cardiology  Adult Congenital Heart Disease  [facility] [address] [place] [address]  [phone]

## 2020-10-02 ENCOUNTER — IMMUNIZATION (OUTPATIENT)
Dept: PHARMACY | Facility: CLINIC | Age: 30
End: 2020-10-02
Payer: COMMERCIAL

## 2020-10-12 DIAGNOSIS — I47.20 VENTRICULAR TACHYCARDIA: ICD-10-CM

## 2020-10-12 DIAGNOSIS — Z87.74 TETRALOGY OF FALLOT S/P REPAIR: Primary | ICD-10-CM

## 2020-10-13 LAB
OHS CV EVENT MONITOR DAY: 1
OHS CV HOLTER LENGTH DECIMAL HOURS: 24
OHS CV HOLTER LENGTH HOURS: 0
OHS CV HOLTER LENGTH MINUTES: 0

## 2020-11-09 ENCOUNTER — TELEPHONE (OUTPATIENT)
Dept: PEDIATRIC CARDIOLOGY | Facility: CLINIC | Age: 30
End: 2020-11-09

## 2020-11-09 NOTE — TELEPHONE ENCOUNTER
Patient and mother are able to quarantine in separate rooms but is not necessary. If patient start to experience COVID symptoms, contact PCP or proceed to ER. Mom verbalized understanding all information provided   ----- Message from Sandra Lafleur sent at 11/9/2020  8:46 AM CST -----  Regarding: Covid  Contact: [phone]  Name of Caller: mom     Reason for Visit/Symptoms:   Mom was exposed to a Covid positive pt. Mom and pt is not showing any symptoms.     Best Contact Number or Confirm if Mychart Preferred: [phone]    Preferred Date/Time of Appointment: anytime     Interested in Virtual Visit (yes/no)???    Additional Information:   Mom would like a call back to see if she should stay in a different room from the pt, if they should continue to wear mask in the home and should the pt go to work.

## 2020-12-23 ENCOUNTER — HISTORICAL (OUTPATIENT)
Dept: LAB | Facility: HOSPITAL | Age: 30
End: 2020-12-23

## 2020-12-23 LAB — SARS-COV-2 RNA RESP QL NAA+PROBE: NOT DETECTED

## 2021-01-27 ENCOUNTER — HISTORICAL (OUTPATIENT)
Dept: ADMINISTRATIVE | Facility: HOSPITAL | Age: 31
End: 2021-01-27

## 2021-01-27 LAB
APPEARANCE, UA: CLEAR
BACTERIA SPEC CULT: ABNORMAL
BILIRUB UR QL STRIP: NEGATIVE
COLOR UR: YELLOW
DEPRECATED CALCIDIOL+CALCIFEROL SERPL-MC: 25.4 NG/ML (ref 30–80)
GLUCOSE (UA): NEGATIVE
HGB UR QL STRIP: ABNORMAL
KETONES UR QL STRIP: NEGATIVE
LEUKOCYTE ESTERASE UR QL STRIP: NEGATIVE
NITRITE UR QL STRIP: NEGATIVE
PH UR STRIP: 7 [PH] (ref 5–9)
PROT UR QL STRIP: NEGATIVE
RBC #/AREA URNS HPF: ABNORMAL /[HPF]
SP GR UR STRIP: 1.02 (ref 1–1.03)
SQUAMOUS EPITHELIAL, UA: ABNORMAL
TSH SERPL-ACNC: 1.98 UIU/ML (ref 0.35–4.94)
UROBILINOGEN UR STRIP-ACNC: 0.2
WBC #/AREA URNS HPF: ABNORMAL /[HPF]

## 2021-02-02 ENCOUNTER — HISTORICAL (OUTPATIENT)
Dept: LAB | Facility: HOSPITAL | Age: 31
End: 2021-02-02

## 2021-02-02 LAB
ABS NEUT (OLG): 3.18 X10(3)/MCL (ref 2.1–9.2)
ALBUMIN SERPL-MCNC: 4 GM/DL (ref 3.5–5)
ALBUMIN/GLOB SERPL: 1.5 RATIO (ref 1.1–2)
ALP SERPL-CCNC: 45 UNIT/L (ref 40–150)
ALT SERPL-CCNC: 11 UNIT/L (ref 0–55)
AST SERPL-CCNC: 20 UNIT/L (ref 5–34)
BASOPHILS # BLD AUTO: 0 X10(3)/MCL (ref 0–0.2)
BASOPHILS NFR BLD AUTO: 0 %
BILIRUB SERPL-MCNC: 0.8 MG/DL
BILIRUBIN DIRECT+TOT PNL SERPL-MCNC: 0.3 MG/DL (ref 0–0.5)
BILIRUBIN DIRECT+TOT PNL SERPL-MCNC: 0.5 MG/DL (ref 0–0.8)
BUN SERPL-MCNC: 9.9 MG/DL (ref 7–18.7)
CALCIUM SERPL-MCNC: 9.2 MG/DL (ref 8.4–10.2)
CHLORIDE SERPL-SCNC: 104 MMOL/L (ref 98–107)
CHOLEST SERPL-MCNC: 174 MG/DL
CHOLEST/HDLC SERPL: 5 {RATIO} (ref 0–5)
CO2 SERPL-SCNC: 25 MMOL/L (ref 22–29)
CREAT SERPL-MCNC: 0.69 MG/DL (ref 0.55–1.02)
EOSINOPHIL # BLD AUTO: 0.1 X10(3)/MCL (ref 0–0.9)
EOSINOPHIL NFR BLD AUTO: 1 %
ERYTHROCYTE [DISTWIDTH] IN BLOOD BY AUTOMATED COUNT: 13.4 % (ref 11.5–17)
GLOBULIN SER-MCNC: 2.7 GM/DL (ref 2.4–3.5)
GLUCOSE SERPL-MCNC: 74 MG/DL (ref 74–100)
HCT VFR BLD AUTO: 41.9 % (ref 37–47)
HDLC SERPL-MCNC: 36 MG/DL (ref 35–60)
HGB BLD-MCNC: 13.6 GM/DL (ref 12–16)
IMM GRANULOCYTES # BLD AUTO: 0.01 % (ref 0–0.02)
IMM GRANULOCYTES NFR BLD AUTO: 0.2 % (ref 0–0.43)
LDLC SERPL CALC-MCNC: 112 MG/DL (ref 50–140)
LYMPHOCYTES # BLD AUTO: 1.2 X10(3)/MCL (ref 0.6–4.6)
LYMPHOCYTES NFR BLD AUTO: 26 %
MCH RBC QN AUTO: 29.6 PG (ref 27–31)
MCHC RBC AUTO-ENTMCNC: 32.5 GM/DL (ref 33–36)
MCV RBC AUTO: 91.1 FL (ref 80–94)
MONOCYTES # BLD AUTO: 0.4 X10(3)/MCL (ref 0.1–1.3)
MONOCYTES NFR BLD AUTO: 8 %
NEUTROPHILS # BLD AUTO: 3.18 X10(3)/MCL (ref 1.4–7.9)
NEUTROPHILS NFR BLD AUTO: 65 %
PLATELET # BLD AUTO: 184 X10(3)/MCL (ref 130–400)
PMV BLD AUTO: 10.9 FL (ref 9.4–12.4)
POTASSIUM SERPL-SCNC: 3.6 MMOL/L (ref 3.5–5.1)
PROT SERPL-MCNC: 6.7 GM/DL (ref 6.4–8.3)
RBC # BLD AUTO: 4.6 X10(6)/MCL (ref 4.2–5.4)
SODIUM SERPL-SCNC: 141 MMOL/L (ref 136–145)
TRIGL SERPL-MCNC: 129 MG/DL (ref 37–140)
VLDLC SERPL CALC-MCNC: 26 MG/DL
WBC # SPEC AUTO: 4.9 X10(3)/MCL (ref 4.5–11.5)

## 2021-02-04 ENCOUNTER — PATIENT MESSAGE (OUTPATIENT)
Dept: CARDIOLOGY | Facility: CLINIC | Age: 31
End: 2021-02-04

## 2021-02-04 ENCOUNTER — DOCUMENTATION ONLY (OUTPATIENT)
Dept: PEDIATRIC CARDIOLOGY | Facility: CLINIC | Age: 31
End: 2021-02-04

## 2021-05-12 ENCOUNTER — PATIENT MESSAGE (OUTPATIENT)
Dept: RESEARCH | Facility: HOSPITAL | Age: 31
End: 2021-05-12

## 2021-05-19 LAB
PAP RECOMMENDATION EXT: NORMAL
PAP SMEAR: NORMAL

## 2021-07-28 ENCOUNTER — HISTORICAL (OUTPATIENT)
Dept: ADMINISTRATIVE | Facility: HOSPITAL | Age: 31
End: 2021-07-28

## 2021-07-28 LAB
BASOPHILS # BLD AUTO: 0 X10E3/UL (ref 0–0.2)
BASOPHILS NFR BLD AUTO: 0 %
EOSINOPHIL # BLD AUTO: 0.1 X10E3/UL (ref 0–0.4)
EOSINOPHIL NFR BLD AUTO: 1 %
ERYTHROCYTE [DISTWIDTH] IN BLOOD BY AUTOMATED COUNT: 12.7 % (ref 11.7–15.4)
HCT VFR BLD AUTO: 38.7 % (ref 34–46.6)
HGB BLD-MCNC: 13.4 G/DL (ref 11.1–15.9)
LYMPHOCYTES # BLD AUTO: 1.1 X10E3/UL (ref 0.7–3.1)
LYMPHOCYTES NFR BLD AUTO: 21 %
MCH RBC QN AUTO: 30.4 PG (ref 26.6–33)
MCHC RBC AUTO-ENTMCNC: 34.6 G/DL (ref 31.5–35.7)
MCV RBC AUTO: 88 FL (ref 79–97)
MONOCYTES # BLD AUTO: 0.4 X10E3/UL (ref 0.1–0.9)
MONOCYTES NFR BLD AUTO: 8 %
NEUTROPHILS # BLD AUTO: 3.6 X10E3/UL (ref 1.4–7)
NEUTROPHILS NFR BLD AUTO: 70 %
PLATELET # BLD AUTO: 170 X10E3/UL (ref 150–450)
RBC # BLD AUTO: 4.41 X10(6)/MCL (ref 3.77–5.28)
WBC # SPEC AUTO: 5.2 X10E3/UL (ref 3.4–10.8)

## 2021-08-17 ENCOUNTER — HISTORICAL (OUTPATIENT)
Dept: LAB | Facility: HOSPITAL | Age: 31
End: 2021-08-17

## 2021-08-17 LAB — POTASSIUM SERPL-SCNC: 3.7 MMOL/L (ref 3.5–5.1)

## 2021-10-18 ENCOUNTER — CLINICAL SUPPORT (OUTPATIENT)
Dept: PEDIATRIC CARDIOLOGY | Facility: CLINIC | Age: 31
End: 2021-10-18
Payer: MEDICARE

## 2021-10-18 ENCOUNTER — OFFICE VISIT (OUTPATIENT)
Dept: PEDIATRIC CARDIOLOGY | Facility: CLINIC | Age: 31
End: 2021-10-18
Payer: MEDICARE

## 2021-10-18 ENCOUNTER — CLINICAL SUPPORT (OUTPATIENT)
Dept: PEDIATRIC CARDIOLOGY | Facility: CLINIC | Age: 31
End: 2021-10-18
Attending: PEDIATRICS
Payer: MEDICARE

## 2021-10-18 VITALS
HEIGHT: 62 IN | BODY MASS INDEX: 31.01 KG/M2 | WEIGHT: 168.5 LBS | SYSTOLIC BLOOD PRESSURE: 114 MMHG | HEART RATE: 63 BPM | OXYGEN SATURATION: 99 % | RESPIRATION RATE: 16 BRPM | DIASTOLIC BLOOD PRESSURE: 70 MMHG

## 2021-10-18 DIAGNOSIS — Q24.9 ADULT CONGENITAL HEART DISEASE: ICD-10-CM

## 2021-10-18 DIAGNOSIS — Z95.3 S/P PULMONARY VALVE REPLACEMENT WITH BIOPROSTHETIC VALVE: ICD-10-CM

## 2021-10-18 DIAGNOSIS — I10 ESSENTIAL HYPERTENSION: ICD-10-CM

## 2021-10-18 DIAGNOSIS — Z98.890 PERSONAL HISTORY OF SURGERY TO HEART AND GREAT VESSELS, PRESENTING HAZARDS TO HEALTH: ICD-10-CM

## 2021-10-18 DIAGNOSIS — Z87.74 TETRALOGY OF FALLOT S/P REPAIR: Primary | ICD-10-CM

## 2021-10-18 DIAGNOSIS — Q24.9 ADULT CONGENITAL HEART DISEASE: Primary | ICD-10-CM

## 2021-10-18 PROCEDURE — 93000 EKG 12-LEAD PEDIATRIC: ICD-10-PCS | Mod: S$GLB,,, | Performed by: PEDIATRICS

## 2021-10-18 PROCEDURE — 93000 ELECTROCARDIOGRAM COMPLETE: CPT | Mod: S$GLB,,, | Performed by: PEDIATRICS

## 2021-10-18 PROCEDURE — 99214 PR OFFICE/OUTPT VISIT, EST, LEVL IV, 30-39 MIN: ICD-10-PCS | Mod: 25,S$GLB,, | Performed by: PEDIATRICS

## 2021-10-18 PROCEDURE — 93244 EXT ECG>48HR<7D REV&INTERPJ: CPT | Mod: ,,, | Performed by: PEDIATRICS

## 2021-10-18 PROCEDURE — 99214 OFFICE O/P EST MOD 30 MIN: CPT | Mod: 25,S$GLB,, | Performed by: PEDIATRICS

## 2021-10-18 PROCEDURE — 93242 CV 3-14 DAY PEDIATRIC HOLTER MONITOR (CUPID ONLY): ICD-10-PCS | Mod: ,,, | Performed by: PEDIATRICS

## 2021-10-18 PROCEDURE — 93244 CV 3-14 DAY PEDIATRIC HOLTER MONITOR (CUPID ONLY): ICD-10-PCS | Mod: ,,, | Performed by: PEDIATRICS

## 2021-10-18 PROCEDURE — 93242 EXT ECG>48HR<7D RECORDING: CPT | Mod: ,,, | Performed by: PEDIATRICS

## 2021-11-01 DIAGNOSIS — Z87.74 TETRALOGY OF FALLOT S/P REPAIR: Primary | ICD-10-CM

## 2021-11-01 DIAGNOSIS — Q24.9 ADULT CONGENITAL HEART DISEASE: ICD-10-CM

## 2021-11-01 DIAGNOSIS — Z95.3 S/P PULMONARY VALVE REPLACEMENT WITH BIOPROSTHETIC VALVE: ICD-10-CM

## 2021-11-01 RX ORDER — HYDROCHLOROTHIAZIDE 25 MG/1
25 TABLET ORAL DAILY
Qty: 30 TABLET | Refills: 11 | Status: SHIPPED | OUTPATIENT
Start: 2021-11-01 | End: 2021-12-26

## 2021-11-03 ENCOUNTER — HISTORICAL (OUTPATIENT)
Dept: LAB | Facility: HOSPITAL | Age: 31
End: 2021-11-03

## 2021-11-03 LAB
BUN SERPL-MCNC: 10.8 MG/DL (ref 7–18.7)
CALCIUM SERPL-MCNC: 10.1 MG/DL (ref 8.7–10.5)
CHLORIDE SERPL-SCNC: 101 MMOL/L (ref 98–107)
CO2 SERPL-SCNC: 28 MMOL/L (ref 22–29)
CREAT SERPL-MCNC: 0.83 MG/DL (ref 0.55–1.02)
CREAT/UREA NIT SERPL: 13
GLUCOSE SERPL-MCNC: 92 MG/DL (ref 74–100)
POTASSIUM SERPL-SCNC: 3.5 MMOL/L (ref 3.5–5.1)
SODIUM SERPL-SCNC: 138 MMOL/L (ref 136–145)

## 2021-11-05 LAB
OHS CV EVENT MONITOR DAY: 3
OHS CV HOLTER HOOKUP DATE: NORMAL
OHS CV HOLTER HOOKUP TIME: NORMAL
OHS CV HOLTER LENGTH DECIMAL HOURS: 72.28
OHS CV HOLTER LENGTH HOURS: 0
OHS CV HOLTER LENGTH MINUTES: 17
OHS CV HOLTER SCAN DATE: NORMAL
OHS CV HOLTER SINUS AVERAGE HR: 61 BPM
OHS CV HOLTER SINUS MAX HR: 94 BPM
OHS CV HOLTER SINUS MIN HR: 50 BPM
OHS CV HOLTER STUDY END DATE: NORMAL
OHS CV HOLTER STUDY END TIME: NORMAL

## 2021-11-06 ENCOUNTER — PATIENT MESSAGE (OUTPATIENT)
Dept: PEDIATRIC CARDIOLOGY | Facility: CLINIC | Age: 31
End: 2021-11-06
Payer: MEDICARE

## 2022-03-23 ENCOUNTER — HISTORICAL (OUTPATIENT)
Dept: LAB | Facility: HOSPITAL | Age: 32
End: 2022-03-23

## 2022-03-23 LAB
ABS NEUT (OLG): 2.96 (ref 2.1–9.2)
ALBUMIN SERPL-MCNC: 3.9 G/DL (ref 3.5–5)
ALBUMIN/GLOB SERPL: 1.4 {RATIO} (ref 1.1–2)
ALP SERPL-CCNC: 50 U/L (ref 40–150)
ALT SERPL-CCNC: 12 U/L (ref 0–55)
APPEARANCE, UA: CLEAR
AST SERPL-CCNC: 19 U/L (ref 5–34)
BACTERIA SPEC CULT: NORMAL
BASOPHILS # BLD AUTO: 0 10*3/UL (ref 0–0.2)
BASOPHILS NFR BLD AUTO: 0 %
BILIRUB SERPL-MCNC: 0.9 MG/DL
BILIRUB UR QL STRIP: NEGATIVE
BILIRUBIN DIRECT+TOT PNL SERPL-MCNC: 0.3 (ref 0–0.5)
BILIRUBIN DIRECT+TOT PNL SERPL-MCNC: 0.6 (ref 0–0.8)
BUN SERPL-MCNC: 8.2 MG/DL (ref 7–18.7)
CALCIUM SERPL-MCNC: 8.9 MG/DL (ref 8.7–10.5)
CHLORIDE SERPL-SCNC: 103 MMOL/L (ref 98–107)
CHOLEST SERPL-MCNC: 168 MG/DL
CHOLEST/HDLC SERPL: 6 {RATIO} (ref 0–5)
CO2 SERPL-SCNC: 28 MMOL/L (ref 22–29)
COLOR UR: YELLOW
CREAT SERPL-MCNC: 0.71 MG/DL (ref 0.55–1.02)
EOSINOPHIL # BLD AUTO: 0 10*3/UL (ref 0–0.9)
EOSINOPHIL NFR BLD AUTO: 1 %
ERYTHROCYTE [DISTWIDTH] IN BLOOD BY AUTOMATED COUNT: 12.6 % (ref 11.5–17)
GLOBULIN SER-MCNC: 2.8 G/DL (ref 2.4–3.5)
GLUCOSE (UA): NEGATIVE
GLUCOSE SERPL-MCNC: 88 MG/DL (ref 74–100)
HCT VFR BLD AUTO: 40.5 % (ref 37–47)
HDLC SERPL-MCNC: 27 MG/DL (ref 35–60)
HEMOLYSIS INTERF INDEX SERPL-ACNC: 7
HGB BLD-MCNC: 13.6 G/DL (ref 12–16)
HGB UR QL STRIP: NORMAL
ICTERIC INTERF INDEX SERPL-ACNC: 1
IMM GRANULOCYTES # BLD AUTO: 0.01 10*3/UL (ref 0–0.02)
IMM GRANULOCYTES NFR BLD AUTO: 0.2 % (ref 0–0.43)
KETONES UR QL STRIP: NEGATIVE
LDLC SERPL CALC-MCNC: 120 MG/DL (ref 50–140)
LEUKOCYTE ESTERASE UR QL STRIP: NEGATIVE
LIPEMIC INTERF INDEX SERPL-ACNC: 5
LYMPHOCYTES # BLD AUTO: 1.1 10*3/UL (ref 0.6–4.6)
LYMPHOCYTES NFR BLD AUTO: 24 %
MANUAL DIFF? (OHS): NO
MCH RBC QN AUTO: 29.8 PG (ref 27–31)
MCHC RBC AUTO-ENTMCNC: 33.6 G/DL (ref 33–36)
MCV RBC AUTO: 88.8 FL (ref 80–94)
MONOCYTES # BLD AUTO: 0.4 10*3/UL (ref 0.1–1.3)
MONOCYTES NFR BLD AUTO: 8 %
NEUTROPHILS # BLD AUTO: 2.96 10*3/UL (ref 1.4–7.9)
NEUTROPHILS NFR BLD AUTO: 66 %
NITRITE UR QL STRIP: NEGATIVE
PH UR STRIP: 7 [PH] (ref 5–9)
PLATELET # BLD AUTO: 160 10*3/UL (ref 130–400)
PMV BLD AUTO: 10.3 FL (ref 9.4–12.4)
POTASSIUM SERPL-SCNC: 3.1 MMOL/L (ref 3.5–5.1)
PROT SERPL-MCNC: 6.7 G/DL (ref 6.4–8.3)
PROT UR QL STRIP: NEGATIVE
RBC # BLD AUTO: 4.56 10*6/UL (ref 4.2–5.4)
RBC #/AREA URNS HPF: NORMAL /[HPF] (ref 0–2)
SODIUM SERPL-SCNC: 140 MMOL/L (ref 136–145)
SP GR UR STRIP: 1.01 (ref 1–1.03)
SQUAMOUS EPITHELIAL, UA: NORMAL
TRIGL SERPL-MCNC: 107 MG/DL (ref 37–140)
TSH SERPL-ACNC: 2.16 M[IU]/L (ref 0.35–4.94)
UROBILINOGEN UR STRIP-ACNC: 0.2
VLDLC SERPL CALC-MCNC: 21 MG/DL
WBC # SPEC AUTO: 4.5 10*3/UL (ref 4.5–11.5)
WBC #/AREA URNS HPF: NORMAL /[HPF] (ref 0–2)

## 2022-04-10 ENCOUNTER — HISTORICAL (OUTPATIENT)
Dept: ADMINISTRATIVE | Facility: HOSPITAL | Age: 32
End: 2022-04-10
Payer: MEDICARE

## 2022-04-24 VITALS
SYSTOLIC BLOOD PRESSURE: 117 MMHG | HEIGHT: 62 IN | BODY MASS INDEX: 29.44 KG/M2 | DIASTOLIC BLOOD PRESSURE: 76 MMHG | OXYGEN SATURATION: 93 % | WEIGHT: 160 LBS

## 2022-05-16 ENCOUNTER — TELEPHONE (OUTPATIENT)
Dept: PRIMARY CARE CLINIC | Facility: CLINIC | Age: 32
End: 2022-05-16
Payer: MEDICARE

## 2022-05-16 NOTE — TELEPHONE ENCOUNTER
----- Message from Peggy Lino Patient Care Assistant sent at 5/16/2022  1:01 PM CDT -----  Regarding: referral for infusion  Type:  Patient Returning Call    Who Called: pt mom Elissa Longo  Who Left Message for Patient: Nurse  Does the patient know what this is regarding?: referral for infusion to be sent to the [facility]  Would the patient rather a call back or a response via [facility]? C/b  Best Call Back Number: [phone]  Additional Information: pt mom ms ba says Dr. Hull was to send a referral for pt to get an infusion done and when ms ba called the infusion center they do not have the form filled out by dr hull. If we could get this filled out and sent over they have an available linda tomorrow @ 1:30 but the infusion center says they need the form before they can give her the linda spot. Please call pt back.    Infusion Center Fax#: [phone]

## 2022-05-16 NOTE — TELEPHONE ENCOUNTER
----- Message from Lissette Stanton sent at 5/16/2022 10:46 AM CDT -----  Regarding: Advice  Type:  Needs Medical Advice    Who Called: Patient mother  Symptoms (please be specific):    How long has patient had these symptoms:    Pharmacy name and phone #:    Would the patient rather a call back or a response via [facility]?   Best Call Back Number:   Additional Information: She wanted to let office know Jocelin tested positive for COVID at a walk in clinic. Infusion center should be calling them today.

## 2022-05-20 ENCOUNTER — INFUSION (OUTPATIENT)
Dept: INFECTIOUS DISEASES | Facility: HOSPITAL | Age: 32
End: 2022-05-20
Attending: INTERNAL MEDICINE
Payer: MEDICARE

## 2022-05-20 VITALS
BODY MASS INDEX: 30.36 KG/M2 | RESPIRATION RATE: 16 BRPM | TEMPERATURE: 98 F | SYSTOLIC BLOOD PRESSURE: 116 MMHG | HEART RATE: 56 BPM | DIASTOLIC BLOOD PRESSURE: 78 MMHG | HEIGHT: 62 IN | WEIGHT: 165 LBS | OXYGEN SATURATION: 98 %

## 2022-05-20 DIAGNOSIS — U07.1 COVID: Primary | ICD-10-CM

## 2022-05-20 PROCEDURE — 63600175 PHARM REV CODE 636 W HCPCS: Performed by: INTERNAL MEDICINE

## 2022-05-20 PROCEDURE — M0222 HC IV INJECTION, BEBTELOVIMAB, INCL POST ADMIN MONIT: HCPCS | Performed by: INTERNAL MEDICINE

## 2022-05-20 RX ORDER — EPINEPHRINE 0.3 MG/.3ML
0.3 INJECTION SUBCUTANEOUS
Status: DISPENSED | OUTPATIENT
Start: 2022-05-20 | End: 2022-05-23

## 2022-05-20 RX ORDER — ALBUTEROL SULFATE 90 UG/1
2 AEROSOL, METERED RESPIRATORY (INHALATION)
Status: DISPENSED | OUTPATIENT
Start: 2022-05-20 | End: 2022-05-23

## 2022-05-20 RX ORDER — BEBTELOVIMAB 87.5 MG/ML
175 INJECTION, SOLUTION INTRAVENOUS
Status: COMPLETED | OUTPATIENT
Start: 2022-05-20 | End: 2022-05-20

## 2022-05-20 RX ORDER — ONDANSETRON 4 MG/1
4 TABLET, ORALLY DISINTEGRATING ORAL
Status: DISPENSED | OUTPATIENT
Start: 2022-05-20 | End: 2022-05-21

## 2022-05-20 RX ORDER — ACETAMINOPHEN 325 MG/1
650 TABLET ORAL
Status: DISPENSED | OUTPATIENT
Start: 2022-05-20 | End: 2022-05-21

## 2022-05-20 RX ORDER — DIPHENHYDRAMINE HYDROCHLORIDE 50 MG/ML
25 INJECTION INTRAMUSCULAR; INTRAVENOUS
Status: DISPENSED | OUTPATIENT
Start: 2022-05-20 | End: 2022-05-21

## 2022-05-20 RX ADMIN — BEBTELOVIMAB 175 MG: 87.5 INJECTION, SOLUTION INTRAVENOUS at 01:05

## 2022-05-26 LAB — HUMAN PAPILLOMAVIRUS (HPV): NORMAL

## 2022-06-07 PROBLEM — K21.9 GASTROESOPHAGEAL REFLUX DISEASE: Status: ACTIVE | Noted: 2022-06-07

## 2022-06-07 PROBLEM — N13.30 HYDRONEPHROSIS: Status: ACTIVE | Noted: 2018-10-22

## 2022-06-07 PROBLEM — I35.1 AORTIC VALVE REGURGITATION: Status: ACTIVE | Noted: 2022-06-07

## 2022-06-07 PROBLEM — U07.1 COVID-19: Status: ACTIVE | Noted: 2022-05-15

## 2022-06-07 PROBLEM — F41.8 MIXED ANXIETY DEPRESSIVE DISORDER: Status: ACTIVE | Noted: 2022-06-07

## 2022-06-07 PROBLEM — J45.909 ASTHMA: Status: ACTIVE | Noted: 2022-06-07

## 2022-08-31 ENCOUNTER — TELEPHONE (OUTPATIENT)
Dept: PEDIATRIC CARDIOLOGY | Facility: CLINIC | Age: 32
End: 2022-08-31
Payer: MEDICAID

## 2022-08-31 DIAGNOSIS — I47.20 VENTRICULAR TACHYCARDIA: ICD-10-CM

## 2022-08-31 DIAGNOSIS — Z95.3 S/P PULMONARY VALVE REPLACEMENT WITH BIOPROSTHETIC VALVE: ICD-10-CM

## 2022-08-31 DIAGNOSIS — Z87.74 TETRALOGY OF FALLOT S/P REPAIR: ICD-10-CM

## 2022-08-31 DIAGNOSIS — Q24.9 CONGENITAL MALFORMATION OF HEART, UNSPECIFIED: Primary | ICD-10-CM

## 2022-08-31 DIAGNOSIS — Q24.9 ADULT CONGENITAL HEART DISEASE: ICD-10-CM

## 2022-08-31 DIAGNOSIS — I35.1 AORTIC VALVE INSUFFICIENCY, ETIOLOGY OF CARDIAC VALVE DISEASE UNSPECIFIED: ICD-10-CM

## 2022-08-31 NOTE — TELEPHONE ENCOUNTER
Appt scheduled for Nov 10 start time 8AM, mom verbalized understanding all information provided   ----- Message from Gina Chirinos sent at 8/26/2022  4:15 PM CDT -----  Contact: Desiree - [phone]  Caller: Desiree - [phone]    Reason: regarding recall notification for October appt / mom called to schedule 1 year follow up including a cardiac MRI & EKG - there are no orders for the MRI available to schedule

## 2022-09-14 NOTE — PROGRESS NOTES
Colleen Hull MD   [address] [place] [address]     PATIENT NAME: Jocelin Longo  : 1990  DATE: 22  MRN: [id number]      Billing Provider: Colleen Hull MD  Level of Service:   Patient PCP Information       Provider PCP Type    Colleen Hull MD General            Reason for Visit / Chief Complaint: 6 MONTHS FOLLOW UP       Update PCP  Update Chief Complaint         History of Present Illness / Problem Focused Workflow     Jocelin Longo presents to the clinic with 6 MONTHS FOLLOW UP     32 year old with corrected Tetrology, Hypothyroid, HTN, CHF, Anxiety with depression and arrythmia here for follow up. She has been doing well. She's trying to get out more. She is trying to eat more healthy.Her swelling has been ok. She and her mother are going to start walking more now that Fall is here and the weather should improve.       Review of Systems     Review of Systems   Constitutional: Negative.    HENT: Negative.     Respiratory: Negative.     Cardiovascular: Negative.  Negative for palpitations and leg swelling.   Gastrointestinal: Negative.    Endocrine: Negative.    Genitourinary: Negative.    Musculoskeletal: Negative.    Psychiatric/Behavioral: Negative.       Medical / Social / Family History     Past Medical History:   Diagnosis Date    Anticoagulant long-term use     Asthma     Hypertension     Hypothyroid     Mild aortic insufficiency     Pulmonary insufficiency     TOF (tetralogy of Fallot)     Ventricular tachycardia        Past Surgical History:   Procedure Laterality Date    CARDIAC SURGERY      PVR    DENTAL SURGERY      EYE SURGERY      TETRALOGY OF FALLOT REPAIR      TONSILLECTOMY         Social History  Ms. Longo      reports that she has never smoked. She has never used smokeless tobacco. She reports that she does not drink alcohol and does not use drugs.    Family History  Ms.'s Longo   family history includes Heart disease in her maternal grandfather and paternal grandmother;  Hypertension in her maternal grandfather and maternal grandmother; No Known Problems in her brother, father, maternal aunt, maternal uncle, mother, paternal aunt, paternal grandfather, paternal uncle, and sister.    Medications and Allergies     Medications  Outpatient Medications Marked as Taking for the 9/21/22 encounter (Office Visit) with Colleen Hull MD   Medication Sig Dispense Refill    aspirin (ECOTRIN) 81 MG EC tablet Take 81 mg by mouth once daily.      hydroCHLOROthiazide (HYDRODIURIL) 25 MG tablet TAKE ONE TABLET BY MOUTH DAILY 30 tablet 11    ibuprofen (ADVIL,MOTRIN) 800 MG tablet Take 800 mg by mouth every 8 (eight) hours as needed for Pain.      levothyroxine (SYNTHROID) 50 MCG tablet TAKE ONE TABLET BY MOUTH DAILY 90 tablet 1    losartan (COZAAR) 50 MG tablet TAKE ONE TABLET BY MOUTH TWICE DAILY 60 tablet 11    metoprolol succinate (TOPROL-XL) 50 MG 24 hr tablet TAKE ONE TABLET BY MOUTH DAILY 30 tablet 11    potassium chloride (MICRO-K) 10 MEQ CpSR TAKE ONE CAPSULE BY MOUTH DAILY 30 capsule 2    sertraline (ZOLOFT) 25 MG tablet Take 25 mg by mouth once daily.         Allergies  Review of patient's allergies indicates:  No Known Allergies    Physical Examination     Vitals:    09/21/22 1020   BP: 115/78   Pulse: (!) 55   Resp: 16   Temp: 98.9 °F (37.2 °C)     Physical Exam  Vitals reviewed.   HENT:      Head: Normocephalic and atraumatic.   Cardiovascular:      Rate and Rhythm: Normal rate and regular rhythm.      Heart sounds: Murmur heard.      Comments: No edema  Pulmonary:      Effort: Pulmonary effort is normal.   Abdominal:      General: Abdomen is flat. Bowel sounds are normal.      Palpations: Abdomen is soft.      Tenderness: There is no abdominal tenderness.   Musculoskeletal:         General: Normal range of motion.   Skin:     General: Skin is warm and dry.   Neurological:      Mental Status: She is alert.         Assessment and Plan (including Health Maintenance)      Problem List   Smart Sets  Document Outside HM   :    Historical on 03/23/2022   Component Date Value    WBC 03/23/2022 4.5     RBC 03/23/2022 4.56     Hgb 03/23/2022 13.6     Hct 03/23/2022 40.5     MCV 03/23/2022 88.8     MCH 03/23/2022 29.8     MCHC 03/23/2022 33.6     RDW 03/23/2022 12.6     Platelet 03/23/2022 160     MPV 03/23/2022 10.3     Abs Neut 03/23/2022 2.96     Manual Diff? 03/23/2022 No     Neut % 03/23/2022 66     Lymph % 03/23/2022 24     Mono % 03/23/2022 8     Eos % 03/23/2022 1     Basophil % 03/23/2022 0     Lymph # 03/23/2022 1.1     Neut # 03/23/2022 2.96     Mono # 03/23/2022 0.4     Eos # 03/23/2022 0.0     Baso # 03/23/2022 0.0     IG# 03/23/2022 0.0100     IG% 03/23/2022 0.200     Sodium Level 03/23/2022 140     Potassium Level 03/23/2022 3.1     Chloride 03/23/2022 103     Carbon Dioxide 03/23/2022 28     Glucose Level 03/23/2022 88     Blood Urea Nitrogen 03/23/2022 8.2     Creatinine 03/23/2022 0.71     Calcium Level Total 03/23/2022 8.9     Albumin Level 03/23/2022 3.9     Protein Total 03/23/2022 6.7     Globulin 03/23/2022 2.8     Albumin/Globulin Ratio 03/23/2022 1.4     Alkaline Phosphatase 03/23/2022 50     Bilirubin Total 03/23/2022 0.9     Bilirubin Direct 03/23/2022 0.3     Bilirubin Indirect 03/23/2022 0.60     Aspartate Aminotransfera* 03/23/2022 19     Alanine Aminotransferase 03/23/2022 12     Hemolysis 03/23/2022 7     Icterus 03/23/2022 1     Lipemia 03/23/2022 5     Cholesterol Total 03/23/2022 168     HDL Cholesterol 03/23/2022 27     Triglyceride 03/23/2022 107     Very Low Density Lipopro* 03/23/2022 21     Cholesterol/HDL Ratio 03/23/2022 6     LDL Cholesterol 03/23/2022 120.00     Estimated GFR- Am* 03/23/2022 >60     Estimated GFR-Non Layne* 03/23/2022 >60     Thyroid Stimulating Horm* 03/23/2022 2.1560     Color, UA 03/23/2022 Yellow     Appearance, UA 03/23/2022 Clear     Specific Gravity,UA 03/23/2022 1.015     pH, UA 03/23/2022 7.0     Protein, UA 03/23/2022 Negative      Glucose, UA 03/23/2022 Negative     Ketones, UA 03/23/2022 Negative     Bilirubin (UA) 03/23/2022 Negative     Occult Blood UA 03/23/2022 Large     Urobilinogen, UA 03/23/2022 0.2     Nitrite, UA 03/23/2022 Negative     Leukocytes, UA 03/23/2022 Negative     WBC, UA 03/23/2022 None Seen     RBC, UA 03/23/2022 5-10     Squam Epithel, UA 03/23/2022 Rare     Bacteria 03/23/2022 None Seen          Plan:     HTN doing well continue meds, doesn't need refills today  TOF stable due in [place] in Nov with MRI.   Hypothyroid good levels will recheck on annual.     Health Maintenance Due   Topic Date Due    Hepatitis C Screening  Never done    HIV Screening  Never done    TETANUS VACCINE  10/29/2014    Influenza Vaccine (1) 09/01/2022       Problem List Items Addressed This Visit          Psychiatric    Mixed anxiety depressive disorder       Cardiac/Vascular    Ventricular tachycardia    Tetralogy of Fallot s/p repair    Essential hypertension - Primary    Aortic valve regurgitation    Overview     dilated ascending aorta            Health Maintenance Topics with due status: Not Due       Topic Last Completion Date    Cervical Cancer Screening 05/26/2022       Future Appointments   Date Time Provider Department Center   11/10/2022  8:00 AM [facility] [id number] [facility]   11/10/2022  9:30 AM EKG, APPT [facility] EKG [facility] Ch[facility]vez   11/10/2022 10:00 AM Mazin Catalan MD [facility] CONHR [facility]            Signature:  Colleen Hull MD  [facility]  [address] [place] [address]    Date of encounter: 9/21/22

## 2022-09-21 ENCOUNTER — PATIENT MESSAGE (OUTPATIENT)
Dept: PRIMARY CARE CLINIC | Facility: CLINIC | Age: 32
End: 2022-09-21

## 2022-09-21 ENCOUNTER — OFFICE VISIT (OUTPATIENT)
Dept: PRIMARY CARE CLINIC | Facility: CLINIC | Age: 32
End: 2022-09-21
Payer: MEDICARE

## 2022-09-21 VITALS
BODY MASS INDEX: 30.36 KG/M2 | HEIGHT: 62 IN | WEIGHT: 165 LBS | OXYGEN SATURATION: 98 % | DIASTOLIC BLOOD PRESSURE: 78 MMHG | RESPIRATION RATE: 16 BRPM | TEMPERATURE: 99 F | HEART RATE: 55 BPM | SYSTOLIC BLOOD PRESSURE: 115 MMHG

## 2022-09-21 DIAGNOSIS — I47.20 VENTRICULAR TACHYCARDIA: ICD-10-CM

## 2022-09-21 DIAGNOSIS — Z87.74 TETRALOGY OF FALLOT S/P REPAIR: ICD-10-CM

## 2022-09-21 DIAGNOSIS — I35.1 NONRHEUMATIC AORTIC VALVE INSUFFICIENCY: ICD-10-CM

## 2022-09-21 DIAGNOSIS — F41.8 MIXED ANXIETY DEPRESSIVE DISORDER: ICD-10-CM

## 2022-09-21 DIAGNOSIS — I10 ESSENTIAL HYPERTENSION: Primary | ICD-10-CM

## 2022-09-21 PROCEDURE — 99212 PR OFFICE/OUTPT VISIT, EST, LEVL II, 10-19 MIN: ICD-10-PCS | Mod: ,,, | Performed by: INTERNAL MEDICINE

## 2022-09-21 PROCEDURE — 3074F PR MOST RECENT SYSTOLIC BLOOD PRESSURE < 130 MM HG: ICD-10-PCS | Mod: CPTII,,, | Performed by: INTERNAL MEDICINE

## 2022-09-21 PROCEDURE — 3008F BODY MASS INDEX DOCD: CPT | Mod: CPTII,,, | Performed by: INTERNAL MEDICINE

## 2022-09-21 PROCEDURE — 1160F RVW MEDS BY RX/DR IN RCRD: CPT | Mod: CPTII,,, | Performed by: INTERNAL MEDICINE

## 2022-09-21 PROCEDURE — 3008F PR BODY MASS INDEX (BMI) DOCUMENTED: ICD-10-PCS | Mod: CPTII,,, | Performed by: INTERNAL MEDICINE

## 2022-09-21 PROCEDURE — 3074F SYST BP LT 130 MM HG: CPT | Mod: CPTII,,, | Performed by: INTERNAL MEDICINE

## 2022-09-21 PROCEDURE — 4010F ACE/ARB THERAPY RXD/TAKEN: CPT | Mod: CPTII,,, | Performed by: INTERNAL MEDICINE

## 2022-09-21 PROCEDURE — 1160F PR REVIEW ALL MEDS BY PRESCRIBER/CLIN PHARMACIST DOCUMENTED: ICD-10-PCS | Mod: CPTII,,, | Performed by: INTERNAL MEDICINE

## 2022-09-21 PROCEDURE — 3078F DIAST BP <80 MM HG: CPT | Mod: CPTII,,, | Performed by: INTERNAL MEDICINE

## 2022-09-21 PROCEDURE — 99212 OFFICE O/P EST SF 10 MIN: CPT | Mod: ,,, | Performed by: INTERNAL MEDICINE

## 2022-09-21 PROCEDURE — 4010F PR ACE/ARB THEARPY RXD/TAKEN: ICD-10-PCS | Mod: CPTII,,, | Performed by: INTERNAL MEDICINE

## 2022-09-21 PROCEDURE — 3078F PR MOST RECENT DIASTOLIC BLOOD PRESSURE < 80 MM HG: ICD-10-PCS | Mod: CPTII,,, | Performed by: INTERNAL MEDICINE

## 2022-09-21 PROCEDURE — 1159F PR MEDICATION LIST DOCUMENTED IN MEDICAL RECORD: ICD-10-PCS | Mod: CPTII,,, | Performed by: INTERNAL MEDICINE

## 2022-09-21 PROCEDURE — 1159F MED LIST DOCD IN RCRD: CPT | Mod: CPTII,,, | Performed by: INTERNAL MEDICINE

## 2022-11-10 ENCOUNTER — HOSPITAL ENCOUNTER (OUTPATIENT)
Dept: CARDIOLOGY | Facility: CLINIC | Age: 32
Discharge: HOME | End: 2022-11-10
Payer: MEDICARE

## 2022-11-10 ENCOUNTER — HOSPITAL ENCOUNTER (OUTPATIENT)
Dept: PEDIATRIC CARDIOLOGY | Facility: HOSPITAL | Age: 32
Discharge: HOME | End: 2022-11-10
Attending: PEDIATRICS
Payer: MEDICARE

## 2022-11-10 ENCOUNTER — PATIENT MESSAGE (OUTPATIENT)
Dept: PRIMARY CARE CLINIC | Facility: CLINIC | Age: 32
End: 2022-11-10
Payer: MEDICAID

## 2022-11-10 ENCOUNTER — HOSPITAL ENCOUNTER (OUTPATIENT)
Dept: RADIOLOGY | Facility: HOSPITAL | Age: 32
Discharge: HOME | End: 2022-11-10
Attending: PEDIATRICS
Payer: MEDICARE

## 2022-11-10 ENCOUNTER — OFFICE VISIT (OUTPATIENT)
Dept: CARDIOLOGY | Facility: CLINIC | Age: 32
End: 2022-11-10
Payer: MEDICARE

## 2022-11-10 VITALS
BODY MASS INDEX: 31.45 KG/M2 | HEART RATE: 62 BPM | DIASTOLIC BLOOD PRESSURE: 82 MMHG | SYSTOLIC BLOOD PRESSURE: 122 MMHG | HEIGHT: 62 IN | WEIGHT: 170.88 LBS | OXYGEN SATURATION: 96 %

## 2022-11-10 DIAGNOSIS — I35.1 NONRHEUMATIC AORTIC VALVE INSUFFICIENCY: ICD-10-CM

## 2022-11-10 DIAGNOSIS — Q24.9 ADULT CONGENITAL HEART DISEASE: ICD-10-CM

## 2022-11-10 DIAGNOSIS — Z87.74 TETRALOGY OF FALLOT S/P REPAIR: ICD-10-CM

## 2022-11-10 DIAGNOSIS — I10 ESSENTIAL HYPERTENSION: Primary | ICD-10-CM

## 2022-11-10 DIAGNOSIS — Q24.9 CONGENITAL MALFORMATION OF HEART, UNSPECIFIED: ICD-10-CM

## 2022-11-10 DIAGNOSIS — Z95.3 S/P PULMONARY VALVE REPLACEMENT WITH BIOPROSTHETIC VALVE: ICD-10-CM

## 2022-11-10 DIAGNOSIS — I10 ESSENTIAL HYPERTENSION: ICD-10-CM

## 2022-11-10 DIAGNOSIS — I35.1 AORTIC VALVE INSUFFICIENCY, ETIOLOGY OF CARDIAC VALVE DISEASE UNSPECIFIED: ICD-10-CM

## 2022-11-10 DIAGNOSIS — Z87.74 TETRALOGY OF FALLOT S/P REPAIR: Primary | ICD-10-CM

## 2022-11-10 DIAGNOSIS — Z79.899 LONG TERM CURRENT USE OF DIURETIC: ICD-10-CM

## 2022-11-10 PROCEDURE — 93242 EXT ECG>48HR<7D RECORDING: CPT

## 2022-11-10 PROCEDURE — 4010F PR ACE/ARB THEARPY RXD/TAKEN: ICD-10-PCS | Mod: CPTII,S$GLB,, | Performed by: PEDIATRICS

## 2022-11-10 PROCEDURE — A9577 INJ MULTIHANCE: HCPCS | Performed by: PEDIATRICS

## 2022-11-10 PROCEDURE — 93010 ELECTROCARDIOGRAM REPORT: CPT | Mod: S$GLB,,, | Performed by: INTERNAL MEDICINE

## 2022-11-10 PROCEDURE — 99999 PR PBB SHADOW E&M-EST. PATIENT-LVL III: ICD-10-PCS | Mod: PBBFAC,,, | Performed by: PEDIATRICS

## 2022-11-10 PROCEDURE — 3074F PR MOST RECENT SYSTOLIC BLOOD PRESSURE < 130 MM HG: ICD-10-PCS | Mod: CPTII,S$GLB,, | Performed by: PEDIATRICS

## 2022-11-10 PROCEDURE — 75561 CARDIAC MRI FOR MORPH W/DYE: CPT | Mod: TC

## 2022-11-10 PROCEDURE — 3079F PR MOST RECENT DIASTOLIC BLOOD PRESSURE 80-89 MM HG: ICD-10-PCS | Mod: CPTII,S$GLB,, | Performed by: PEDIATRICS

## 2022-11-10 PROCEDURE — 75561 CV CARDIAC MRI MORPHOLOGY (CUPID ONLY): ICD-10-PCS | Mod: 26,,, | Performed by: PEDIATRICS

## 2022-11-10 PROCEDURE — 1159F MED LIST DOCD IN RCRD: CPT | Mod: CPTII,S$GLB,, | Performed by: PEDIATRICS

## 2022-11-10 PROCEDURE — 93010 EKG 12-LEAD: ICD-10-PCS | Mod: S$GLB,,, | Performed by: INTERNAL MEDICINE

## 2022-11-10 PROCEDURE — 3008F BODY MASS INDEX DOCD: CPT | Mod: CPTII,S$GLB,, | Performed by: PEDIATRICS

## 2022-11-10 PROCEDURE — 25500020 PHARM REV CODE 255: Performed by: PEDIATRICS

## 2022-11-10 PROCEDURE — 93244 EXT ECG>48HR<7D REV&INTERPJ: CPT | Mod: ,,, | Performed by: PEDIATRICS

## 2022-11-10 PROCEDURE — 3074F SYST BP LT 130 MM HG: CPT | Mod: CPTII,S$GLB,, | Performed by: PEDIATRICS

## 2022-11-10 PROCEDURE — 93244 CV 3-14 DAY PEDIATRIC HOLTER MONITOR (CUPID ONLY): ICD-10-PCS | Mod: ,,, | Performed by: PEDIATRICS

## 2022-11-10 PROCEDURE — 99214 OFFICE O/P EST MOD 30 MIN: CPT | Mod: 25,S$GLB,, | Performed by: PEDIATRICS

## 2022-11-10 PROCEDURE — 4010F ACE/ARB THERAPY RXD/TAKEN: CPT | Mod: CPTII,S$GLB,, | Performed by: PEDIATRICS

## 2022-11-10 PROCEDURE — 3079F DIAST BP 80-89 MM HG: CPT | Mod: CPTII,S$GLB,, | Performed by: PEDIATRICS

## 2022-11-10 PROCEDURE — 99214 PR OFFICE/OUTPT VISIT, EST, LEVL IV, 30-39 MIN: ICD-10-PCS | Mod: 25,S$GLB,, | Performed by: PEDIATRICS

## 2022-11-10 PROCEDURE — 1159F PR MEDICATION LIST DOCUMENTED IN MEDICAL RECORD: ICD-10-PCS | Mod: CPTII,S$GLB,, | Performed by: PEDIATRICS

## 2022-11-10 PROCEDURE — 93005 ELECTROCARDIOGRAM TRACING: CPT | Mod: S$GLB,,, | Performed by: PEDIATRICS

## 2022-11-10 PROCEDURE — 75561 CARDIAC MRI FOR MORPH W/DYE: CPT | Mod: 26,,, | Performed by: PEDIATRICS

## 2022-11-10 PROCEDURE — 3008F PR BODY MASS INDEX (BMI) DOCUMENTED: ICD-10-PCS | Mod: CPTII,S$GLB,, | Performed by: PEDIATRICS

## 2022-11-10 PROCEDURE — 93005 EKG 12-LEAD: ICD-10-PCS | Mod: S$GLB,,, | Performed by: PEDIATRICS

## 2022-11-10 PROCEDURE — 99999 PR PBB SHADOW E&M-EST. PATIENT-LVL III: CPT | Mod: PBBFAC,,, | Performed by: PEDIATRICS

## 2022-11-10 RX ADMIN — GADOBENATE DIMEGLUMINE 32 ML: 529 INJECTION, SOLUTION INTRAVENOUS at 09:11

## 2022-11-10 NOTE — PROGRESS NOTES
11/10/2022    RE:JOCELIN LONGO BRIELLE  [facility]# [id number]    Colleen Hull MD   [facility] [id number] [facility]    Dear Dr. Hull:    I had the pleasure of seeing Jocelin in our adult congenital cardiology clinic in [place]. Jocelin Longo is a 32 y.o. female with repaired tetralogy of Fallot.  To summarize, her diagnoses are as follows:   Diagnoses:  1. Tetralogy of Fallot status post repair.  Normal biventricular function with minimal RV enlargement.  2. Now s/p resection of right ventricular muscle bundles, pulmonary valve replacement with a 23 mm Trifecta valve and augmentation of the main pulmonary artery 12/17/14   - no significant valve stenosis   - likely mild pulmonary insufficiency   3. Palpitations with a history of ventricular arrhythmia on Holter monitor and a negative EP study.    4. Mild aortic insufficiency   5. Choking on food - chronic issue and not worsened.  No vocal cord dysfunction noted on ENT evaluation, but she does have a mild glottic gap.  6. Consider 22q11 deletion.  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 Significant hypertension.  Not due to her congenital heart disease.  Much improved.  9. Very difficult echo imaging, MRI pending    Discussion:  1. Good dental hygiene.  She definitely needs antibiotic prophylaxis before dental work.  2. continue current medications including aspirin, losartan, metoprolol.    3. follow up in 1 year in [place] with ekg and echo, holter  4. Healthy, low salt diet.  Regular low intensity exercise.  5. Contact me with any issues.  Any neuro changes, shortness of breath, edema, etc... should prompt immediate evaluation.  6. They state that she gets regular labs every 6 months and will be scheduled for this by December.  From my standpoint, I just need a basic metabolic panel to make sure her potassium and creatinine are okay.  7. Holter monitor today  8. Follow up with PCP  9. Follow up  MRI from today.  I will call her with report.    Discussion:  From a cardiac standpoint, she looks great.  She continues with an unchanged diastolic murmur, and I suspect that her PI will be mild to moderate on MRI.  I will call her.  A holter was placed.  She will need her pulmonary valve replaced in the future, but she should be a good candidate for a catheter based valve.    Interval history:    Overall, she has done very well since I last saw her a year ago.  No worsening dyspnea on exertion, shortness of breath, chest pain, syncope, near syncope, cyanosis, or edema.  She does have occasional very brief palpitations.  These occur at rest.  This is not worsened compared to years past.      10/8/18.  She had a renal US and abdominal CTA that showed no evidence of kidney disease or renal artery stenosis, and her creatinine and thyroid studies were normal.      She has problems with both feet.  She has hypothyroidism.  On 12/17/14, she underwent the following surgical procedure:  Redo median sternotomy, resection of right ventricular muscle bundles, pulmonary valve replacement with a 23 mm Trifecta valve and augmentation of the main pulmonary artery.  The surgery was complicated by adherence of the heart to the sternum, making dissection difficult.  They accessed the RFA for bypass, but were not able to get in the vein (chronic occlusion).  She ultimately did quite well, however, and was discharged home after 4 days.      Years ago, I had her seen in ENT clinic.  The conclusions from that visit were as follows:  In summary, this patient has normal vocal cord motion bilaterally, ruling out any functional impairment from a paralyzed vocal cord given her remote history of TOF surgery. She does have a small glottic gap and this may contribute to her hoarseness and intermittent throat clearing with PO liquid. She tolerated PO liquid in the exam room without incident. It is likely that she is experiencing intermittent  bouts of laryngeal penetration, not aspiration given her clinical picture. Discussed the only way to know for sure is with a MBSS. However, given the rarity of these episodes(Once every 1-2 weeks), it is not likely that we will catch any abnormality on that test. I have discussed a course of observation, as there is no immediate concern for aspiration. Should her symptoms of throat clearing with PO liquid intake increase in nature, we can then proceed with a MBSS to further characterize the nature of her dysphagia.     Several years ago, Jocelin was admitted to the hospital for evaluation after Holter demonstrated runs of ventricular tachycardia. During this admission, she had electrophysiology study which demonstrated no inducible ventricular arrhythmia, and she was started on a beta blocker.     The review of systems is as noted above. It is otherwise negative for other symptoms related to the general, neurological, psychiatric, endocrine, gastrointestinal, genitourinary, respiratory, dermatologic, musculoskeletal, hematologic, and immunologic systems.     Current Outpatient Medications on File Prior to Visit   Medication Sig Dispense Refill    aspirin (ECOTRIN) 81 MG EC tablet Take 81 mg by mouth once daily.      hydroCHLOROthiazide (HYDRODIURIL) 25 MG tablet TAKE ONE TABLET BY MOUTH DAILY 30 tablet 11    ibuprofen (ADVIL,MOTRIN) 800 MG tablet Take 800 mg by mouth every 8 (eight) hours as needed for Pain.      levothyroxine (SYNTHROID) 50 MCG tablet TAKE ONE TABLET BY MOUTH DAILY 90 tablet 1    losartan (COZAAR) 50 MG tablet TAKE ONE TABLET BY MOUTH TWICE DAILY 60 tablet 0    metoprolol succinate (TOPROL-XL) 50 MG 24 hr tablet TAKE ONE TABLET BY MOUTH DAILY 30 tablet 11    potassium chloride (MICRO-K) 10 MEQ CpSR TAKE ONE CAPSULE BY MOUTH DAILY 30 capsule 2    sertraline (ZOLOFT) 25 MG tablet Take 25 mg by mouth once daily.       Current Facility-Administered Medications on File Prior to Visit   Medication Dose  "Route Frequency Provider Last Rate Last Admin    [COMPLETED] gadobenate dimeglumine (MULTIHANCE) injection 32 mL  32 mL Intravenous ONCE PRN Mazin Catalan MD   32 mL at 11/10/22 0904   Review of patient's allergies indicates:  No Known Allergies  She is no longer taking the oxycodone.  IMMUNIZATIONS:UTD  SOCIAL: Currently lives with parents. She does not smoke. She will have an occasional alcoholic beverage.  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      Vitals:    11/10/22 1000 11/10/22 1002   BP: 125/76 122/82   BP Location: Right arm Left arm   Patient Position: Sitting Sitting   BP Method: Large (Automatic) Large (Automatic)   Pulse: 62 62   SpO2: 96%    Weight: 77.5 kg (170 lb 13.7 oz)    Height: 5' 2" (1.575 m)      GENERAL: Jocelin is a well-developed well-nourished female. She moves symmetrically. She does have a stutter.   HEENT: No dysmorphic features are noted. Visual tracking is normal with symmetric grimace. Teeth are in good repair. No lymphadenopathy is noted.    CHEST: There is a well healed median sternotomy scar and a well healed left thoracotomy scar.  CARDIAC: The precordium is quiet. S1 is single with a split S2. There is a grade 1/6 systolic ejection murmur and a 1-2/6 soft, low pitched diastolic murmur at the RUSB.  ABDOMEN: The abdomen is soft with no hepatosplenomegaly.  EXTREMITIES: Extremities are warm and well perfused. Pulses are good in all extremities with no edema clubbing or cyanosis noted.    I personally reviewed the following tests performed today and my interpretation follows:  An EKG performed in clinic today reveals sinus rhythm with a rate of around 55.  A right bundle branch block is present.  There is no ectopy.  EKG is unchanged.    MRI pending.    Lab Results   Component Value Date    WBC 4.5 03/23/2022    HGB 13.6 03/23/2022    HCT 40.5 03/23/2022    MCV 88.8 03/23/2022     03/23/2022       CMP  Sodium   Date Value Ref Range Status   01/02/2015 140 136 - 145 " mmol/L Final     Sodium Level   Date Value Ref Range Status   03/23/2022 140 136 - 145      Potassium   Date Value Ref Range Status   01/02/2015 4.8 3.5 - 5.1 mmol/L Final     Potassium Level   Date Value Ref Range Status   03/23/2022 3.1 3.5 - 5.1      Chloride   Date Value Ref Range Status   01/02/2015 105 95 - 110 mmol/L Final     CO2   Date Value Ref Range Status   01/02/2015 25 23 - 29 mmol/L Final     Carbon Dioxide   Date Value Ref Range Status   03/23/2022 28 22 - 29      Glucose   Date Value Ref Range Status   01/02/2015 82 70 - 110 mg/dL Final     BUN   Date Value Ref Range Status   01/02/2015 8 6 - 20 mg/dL Final     Blood Urea Nitrogen   Date Value Ref Range Status   03/23/2022 8.2 7.0 - 18.7      Creatinine   Date Value Ref Range Status   03/23/2022 0.71 0.55 - 1.02    01/02/2015 0.8 0.5 - 1.4 mg/dL Final     Calcium   Date Value Ref Range Status   01/02/2015 9.4 8.7 - 10.5 mg/dL Final     Calcium Level Total   Date Value Ref Range Status   03/23/2022 8.9 8.7 - 10.5      Total Protein   Date Value Ref Range Status   12/30/2014 6.4 6.0 - 8.4 g/dL Final     Albumin   Date Value Ref Range Status   12/30/2014 3.1 (L) 3.5 - 5.2 g/dL Final     Albumin Level   Date Value Ref Range Status   03/23/2022 3.9 3.5 - 5.0      Total Bilirubin   Date Value Ref Range Status   12/30/2014 0.9 0.1 - 1.0 mg/dL Final     Comment:     For infants and newborns, interpretation of results should be based  on gestational age, weight and in agreement with clinical  observations.  Premature Infant recommended reference ranges:  Up to 24 hours.............<8.0 mg/dL  Up to 48 hours............<12.0 mg/dL  3-5 days..................<15.0 mg/dL  6-29 days.................<15.0 mg/dL       Bilirubin Total   Date Value Ref Range Status   03/23/2022 0.9 <=1.5      Alkaline Phosphatase   Date Value Ref Range Status   03/23/2022 50 40 - 150    12/30/2014 79 55 - 135 U/L Final     AST   Date Value Ref Range Status   12/30/2014 21 10 - 40  U/L Final     Aspartate Aminotransferase   Date Value Ref Range Status   03/23/2022 19 5 - 34      ALT   Date Value Ref Range Status   12/30/2014 18 10 - 44 U/L Final     Alanine Aminotransferase   Date Value Ref Range Status   03/23/2022 12 0 - 55      Anion Gap   Date Value Ref Range Status   01/02/2015 10 8 - 16 mmol/L Final     eGFR if    Date Value Ref Range Status   01/02/2015 >60.0 >60 mL/min/1.73 m^2 Final     eGFR if non    Date Value Ref Range Status   01/02/2015 >60.0 >60 mL/min/1.73 m^2 Final     Comment:     Calculation used to obtain the estimated glomerular filtration  rate (eGFR) is the CKD-EPI equation. Since race is unknown   in our information system, the eGFR values for   -American and Non--American patients are given   for each creatinine result.       Estimated GFR-Non    Date Value Ref Range Status   03/23/2022 >60       BNP   Date Value Ref Range Status   12/30/2014 103 (H) 0 - 99 pg/mL Final     Comment:     Values of less than 100 pg/ml are consistent with non-CHF populations.          Cardiac MRI October 1, 2020:  Top normal, mildly increased right ventricular volumes. (RVEDV 101 cc/m2 for BSA, RVESV 54 cc/m2 for BSA).  Slight decrease in RV volumes for BSA in the setting of increased BSA when compared to MRI 2016. RVEF 47 %.  Normal left ventricular volume with LVEF 62 %.  RVOT obscured by artifact from the sternum.  Well-seated bioprosthetic pulmonary valve. Pulmonary insufficiency with regurgitant fraction of 19% and regurgitation volume of 13 cc. Peak velocity 1.9m/sec.   Normal size of main and branch pulmonary arteries.   Aortic insufficiency, regurgitant fraction 3%, regurgitant volume 2 cc.   Top normal size of the aortic root with mildly dilated ascending aorta. No change in size of the ascending aorta when compared to MRI 2016.  Tricuspid valve regurgitation noted.    CPX testing 2016:  4) The PkVO2 was 20.6 ml/kg/min  which is 55% of predicted equating to a functional capacity of 5.9 METS indicating moderate functional impairment.   CONCLUSIONS   CPX Conclusions:  Moderate functional impairment associated with a normal breathing reserve, normal oxygen saturation, an adequate effort, and a borderline reduced AT. These findings are indicative of functional impairment secondary to circulatory insufficiency.     Cardiac MRI 12/2016:  Conclusion:   27 yo with tetralogy of Fallot s/p repair, s/p pulmonary valve replacement.   1. The right ventricle end diastolic volume is top normal, end systolic volume is increased. (RVEDV 110 cc/m2 for BSA, RVESV 65 cc/m2 for BSA).  RVEF 41%.  2. Normal left ventricular size with LVEF 60 %.  3. There is a pulmonary valve prosthesis. The RVOT was difficult to visualize due to artifact from sternal wires. Pulmonary insufficiency, regurgitant fraction 14%. Peak velocity 1.7 m/sec.  4. Normal size of main and RPA, mild LPA hypoplasia.   5. Aortic insufficiency, regurgitant fraction 11%, regurgitant volume 7 cc.   6. Dilated ascending aorta, meaurements as above.   7. Right atrial enlargement.   8. Tricuspid regurgitation noted.     Sincerely,    Mazin Catalan MD  Pediatric Cardiology  Adult Congenital Heart Disease  [facility]  [address] [place] [address]  [phone]

## 2022-11-11 ENCOUNTER — PATIENT MESSAGE (OUTPATIENT)
Dept: CARDIOLOGY | Facility: CLINIC | Age: 32
End: 2022-11-11
Payer: MEDICAID

## 2022-11-12 ENCOUNTER — LAB VISIT (OUTPATIENT)
Dept: LAB | Facility: HOSPITAL | Age: 32
End: 2022-11-12
Attending: INTERNAL MEDICINE
Payer: MEDICARE

## 2022-11-12 DIAGNOSIS — Z79.899 LONG TERM CURRENT USE OF DIURETIC: ICD-10-CM

## 2022-11-12 DIAGNOSIS — I10 ESSENTIAL HYPERTENSION: ICD-10-CM

## 2022-11-12 LAB
ANION GAP SERPL CALC-SCNC: 11 MEQ/L
BUN SERPL-MCNC: 10 MG/DL (ref 7–18.7)
CALCIUM SERPL-MCNC: 9.8 MG/DL (ref 8.4–10.2)
CHLORIDE SERPL-SCNC: 102 MMOL/L (ref 98–107)
CO2 SERPL-SCNC: 29 MMOL/L (ref 22–29)
CREAT SERPL-MCNC: 0.74 MG/DL (ref 0.55–1.02)
CREAT/UREA NIT SERPL: 14
GFR SERPLBLD CREATININE-BSD FMLA CKD-EPI: >60 MLS/MIN/1.73/M2
GLUCOSE SERPL-MCNC: 83 MG/DL (ref 74–100)
POTASSIUM SERPL-SCNC: 3.4 MMOL/L (ref 3.5–5.1)
SODIUM SERPL-SCNC: 142 MMOL/L (ref 136–145)

## 2022-11-12 PROCEDURE — 80048 BASIC METABOLIC PNL TOTAL CA: CPT

## 2022-11-12 PROCEDURE — 36415 COLL VENOUS BLD VENIPUNCTURE: CPT

## 2022-11-14 ENCOUNTER — TELEPHONE (OUTPATIENT)
Dept: PRIMARY CARE CLINIC | Facility: CLINIC | Age: 32
End: 2022-11-14
Payer: MEDICAID

## 2022-11-14 DIAGNOSIS — I10 ESSENTIAL HYPERTENSION: ICD-10-CM

## 2022-11-14 DIAGNOSIS — E87.6 HYPOKALEMIA: Primary | ICD-10-CM

## 2022-11-14 NOTE — TELEPHONE ENCOUNTER
----- Message from Colleen Hull MD sent at 11/14/2022  8:25 AM CST -----  Potassium is still a little low she's not missing her supplement is she? If not I'll increase it.

## 2022-11-17 ENCOUNTER — PATIENT MESSAGE (OUTPATIENT)
Dept: CARDIOLOGY | Facility: CLINIC | Age: 32
End: 2022-11-17
Payer: MEDICAID

## 2022-11-20 LAB
OHS CV EVENT MONITOR DAY: 1
OHS CV HOLTER HOOKUP DATE: NORMAL
OHS CV HOLTER HOOKUP TIME: NORMAL
OHS CV HOLTER LENGTH DECIMAL HOURS: 25
OHS CV HOLTER LENGTH HOURS: 1
OHS CV HOLTER LENGTH MINUTES: 0
OHS CV HOLTER SCAN DATE: NORMAL
OHS CV HOLTER SINUS AVERAGE HR: 58 BPM
OHS CV HOLTER SINUS MAX HR: 83 BPM
OHS CV HOLTER SINUS MIN HR: 49 BPM
OHS CV HOLTER STUDY END DATE: NORMAL
OHS CV HOLTER STUDY END TIME: NORMAL

## 2022-11-21 ENCOUNTER — PATIENT MESSAGE (OUTPATIENT)
Dept: CARDIOLOGY | Facility: CLINIC | Age: 32
End: 2022-11-21
Payer: MEDICAID

## 2022-12-01 ENCOUNTER — DOCUMENTATION ONLY (OUTPATIENT)
Dept: PRIMARY CARE CLINIC | Facility: CLINIC | Age: 32
End: 2022-12-01
Payer: MEDICAID

## 2022-12-14 ENCOUNTER — TELEPHONE (OUTPATIENT)
Dept: PRIMARY CARE CLINIC | Facility: CLINIC | Age: 32
End: 2022-12-14
Payer: MEDICAID

## 2022-12-14 ENCOUNTER — LAB VISIT (OUTPATIENT)
Dept: LAB | Facility: HOSPITAL | Age: 32
End: 2022-12-14
Attending: INTERNAL MEDICINE
Payer: MEDICARE

## 2022-12-14 DIAGNOSIS — E87.6 HYPOKALEMIA: ICD-10-CM

## 2022-12-14 DIAGNOSIS — I10 ESSENTIAL HYPERTENSION: ICD-10-CM

## 2022-12-14 LAB
ANION GAP SERPL CALC-SCNC: 11 MEQ/L
BUN SERPL-MCNC: 12.3 MG/DL (ref 7–18.7)
CALCIUM SERPL-MCNC: 9.3 MG/DL (ref 8.4–10.2)
CHLORIDE SERPL-SCNC: 103 MMOL/L (ref 98–107)
CO2 SERPL-SCNC: 26 MMOL/L (ref 22–29)
CREAT SERPL-MCNC: 0.74 MG/DL (ref 0.55–1.02)
CREAT/UREA NIT SERPL: 17
GFR SERPLBLD CREATININE-BSD FMLA CKD-EPI: >60 MLS/MIN/1.73/M2
GLUCOSE SERPL-MCNC: 77 MG/DL (ref 74–100)
POTASSIUM SERPL-SCNC: 3.7 MMOL/L (ref 3.5–5.1)
SODIUM SERPL-SCNC: 140 MMOL/L (ref 136–145)

## 2022-12-14 PROCEDURE — 80048 BASIC METABOLIC PNL TOTAL CA: CPT

## 2022-12-14 PROCEDURE — 36415 COLL VENOUS BLD VENIPUNCTURE: CPT

## 2022-12-14 NOTE — TELEPHONE ENCOUNTER
----- Message from Colleen Hull MD sent at 12/14/2022 12:58 PM CST -----  Much better! Don't skip those potassium pills.

## 2022-12-19 ENCOUNTER — PATIENT MESSAGE (OUTPATIENT)
Dept: PRIMARY CARE CLINIC | Facility: CLINIC | Age: 32
End: 2022-12-19
Payer: MEDICAID

## 2022-12-19 ENCOUNTER — TELEPHONE (OUTPATIENT)
Dept: PRIMARY CARE CLINIC | Facility: CLINIC | Age: 32
End: 2022-12-19
Payer: MEDICAID

## 2022-12-19 RX ORDER — CEFADROXIL 500 MG/1
500 CAPSULE ORAL EVERY 12 HOURS
Qty: 20 CAPSULE | Refills: 0 | Status: SHIPPED | OUTPATIENT
Start: 2022-12-19 | End: 2022-12-29

## 2022-12-19 NOTE — TELEPHONE ENCOUNTER
I'm sending Duricef to [facility]. Has she been around anyone with strep throat? Is there any pain in throat or teeth?

## 2022-12-19 NOTE — TELEPHONE ENCOUNTER
----- Message from Brenna Frausto sent at 12/19/2022 10:30 AM CST -----  Regarding: med adv  Type:  Needs Medical Advice    Who Called: Elissa, patient's mother  Symptoms (please be specific): swollen gland in neck    How long has patient had these symptoms:  Friday  Pharmacy name and phone #:  [facility] in [place]  Would the patient rather a call back or a response via [facility]?   Best Call Back Number: [phone]  Additional Information: Patient needed an appt but next available is not until 01/25/23. Please call patient's mother back.

## 2023-01-18 ENCOUNTER — DOCUMENTATION ONLY (OUTPATIENT)
Dept: PRIMARY CARE CLINIC | Facility: CLINIC | Age: 33
End: 2023-01-18
Payer: MEDICARE

## 2023-02-20 ENCOUNTER — PATIENT MESSAGE (OUTPATIENT)
Dept: PRIMARY CARE CLINIC | Facility: CLINIC | Age: 33
End: 2023-02-20
Payer: MEDICARE

## 2023-02-26 ENCOUNTER — PATIENT MESSAGE (OUTPATIENT)
Dept: PRIMARY CARE CLINIC | Facility: CLINIC | Age: 33
End: 2023-02-26
Payer: MEDICARE

## 2023-03-21 PROBLEM — U07.1 COVID-19: Status: RESOLVED | Noted: 2022-05-15 | Resolved: 2023-03-21

## 2023-03-22 ENCOUNTER — OFFICE VISIT (OUTPATIENT)
Dept: PRIMARY CARE CLINIC | Facility: CLINIC | Age: 33
End: 2023-03-22
Payer: MEDICARE

## 2023-03-22 VITALS
HEART RATE: 54 BPM | TEMPERATURE: 98 F | BODY MASS INDEX: 31.83 KG/M2 | WEIGHT: 173 LBS | HEIGHT: 62 IN | DIASTOLIC BLOOD PRESSURE: 71 MMHG | SYSTOLIC BLOOD PRESSURE: 109 MMHG | OXYGEN SATURATION: 99 % | RESPIRATION RATE: 18 BRPM

## 2023-03-22 DIAGNOSIS — I10 ESSENTIAL HYPERTENSION: ICD-10-CM

## 2023-03-22 DIAGNOSIS — Q24.9 ADULT CONGENITAL HEART DISEASE: ICD-10-CM

## 2023-03-22 DIAGNOSIS — F41.8 MIXED ANXIETY DEPRESSIVE DISORDER: ICD-10-CM

## 2023-03-22 DIAGNOSIS — E03.9 ACQUIRED HYPOTHYROIDISM: ICD-10-CM

## 2023-03-22 DIAGNOSIS — Z00.00 WELLNESS EXAMINATION: Primary | ICD-10-CM

## 2023-03-22 DIAGNOSIS — Z95.3 S/P PULMONARY VALVE REPLACEMENT WITH BIOPROSTHETIC VALVE: ICD-10-CM

## 2023-03-22 DIAGNOSIS — Z87.74 TETRALOGY OF FALLOT S/P REPAIR: ICD-10-CM

## 2023-03-22 LAB
ALBUMIN SERPL-MCNC: 3.9 G/DL (ref 3.5–5)
ALBUMIN/GLOB SERPL: 1.4 RATIO (ref 1.1–2)
ALP SERPL-CCNC: 53 UNIT/L (ref 40–150)
ALT SERPL-CCNC: 26 UNIT/L (ref 0–55)
APPEARANCE UR: CLEAR
AST SERPL-CCNC: 26 UNIT/L (ref 5–34)
BACTERIA #/AREA URNS AUTO: NORMAL /HPF
BASOPHILS # BLD AUTO: 0.03 X10(3)/MCL (ref 0–0.2)
BASOPHILS NFR BLD AUTO: 0.5 %
BILIRUB UR QL STRIP.AUTO: NEGATIVE MG/DL
BILIRUBIN DIRECT+TOT PNL SERPL-MCNC: 0.8 MG/DL
BUN SERPL-MCNC: 8.1 MG/DL (ref 7–18.7)
CALCIUM SERPL-MCNC: 9.4 MG/DL (ref 8.4–10.2)
CHLORIDE SERPL-SCNC: 102 MMOL/L (ref 98–107)
CHOLEST SERPL-MCNC: 192 MG/DL
CHOLEST/HDLC SERPL: 6 {RATIO} (ref 0–5)
CO2 SERPL-SCNC: 28 MMOL/L (ref 22–29)
COLOR UR AUTO: YELLOW
CREAT SERPL-MCNC: 0.68 MG/DL (ref 0.55–1.02)
EOSINOPHIL # BLD AUTO: 0.05 X10(3)/MCL (ref 0–0.9)
EOSINOPHIL NFR BLD AUTO: 0.9 %
ERYTHROCYTE [DISTWIDTH] IN BLOOD BY AUTOMATED COUNT: 13.3 % (ref 11.5–17)
GFR SERPLBLD CREATININE-BSD FMLA CKD-EPI: >60 MLS/MIN/1.73/M2
GLOBULIN SER-MCNC: 2.7 GM/DL (ref 2.4–3.5)
GLUCOSE SERPL-MCNC: 84 MG/DL (ref 74–100)
GLUCOSE UR QL STRIP.AUTO: NEGATIVE MG/DL
HCT VFR BLD AUTO: 41.1 % (ref 37–47)
HDLC SERPL-MCNC: 34 MG/DL (ref 35–60)
HGB BLD-MCNC: 13 G/DL (ref 12–16)
IMM GRANULOCYTES # BLD AUTO: 0.01 X10(3)/MCL (ref 0–0.04)
IMM GRANULOCYTES NFR BLD AUTO: 0.2 %
KETONES UR QL STRIP.AUTO: NEGATIVE MG/DL
LDLC SERPL CALC-MCNC: 127 MG/DL (ref 50–140)
LEUKOCYTE ESTERASE UR QL STRIP.AUTO: ABNORMAL UNIT/L
LYMPHOCYTES # BLD AUTO: 1.23 X10(3)/MCL (ref 0.6–4.6)
LYMPHOCYTES NFR BLD AUTO: 22.1 %
MCH RBC QN AUTO: 29.3 PG
MCHC RBC AUTO-ENTMCNC: 31.6 G/DL (ref 33–36)
MCV RBC AUTO: 92.8 FL (ref 80–94)
MONOCYTES # BLD AUTO: 0.43 X10(3)/MCL (ref 0.1–1.3)
MONOCYTES NFR BLD AUTO: 7.7 %
NEUTROPHILS # BLD AUTO: 3.82 X10(3)/MCL (ref 2.1–9.2)
NEUTROPHILS NFR BLD AUTO: 68.6 %
NITRITE UR QL STRIP.AUTO: NEGATIVE
PH UR STRIP.AUTO: 7 [PH]
PLATELET # BLD AUTO: 157 X10(3)/MCL (ref 130–400)
PMV BLD AUTO: 11.6 FL (ref 7.4–10.4)
POTASSIUM SERPL-SCNC: 3.5 MMOL/L (ref 3.5–5.1)
PROT SERPL-MCNC: 6.6 GM/DL (ref 6.4–8.3)
PROT UR QL STRIP.AUTO: NEGATIVE MG/DL
RBC # BLD AUTO: 4.43 X10(6)/MCL (ref 4.2–5.4)
RBC #/AREA URNS AUTO: NORMAL /HPF
RBC UR QL AUTO: NEGATIVE UNIT/L
SODIUM SERPL-SCNC: 140 MMOL/L (ref 136–145)
SP GR UR STRIP.AUTO: 1.02
SQUAMOUS #/AREA URNS AUTO: NORMAL /HPF
TRIGL SERPL-MCNC: 154 MG/DL (ref 37–140)
TSH SERPL-ACNC: 1.81 UIU/ML (ref 0.35–4.94)
UROBILINOGEN UR STRIP-ACNC: 0.2 MG/DL
VLDLC SERPL CALC-MCNC: 31 MG/DL
WBC # SPEC AUTO: 5.6 X10(3)/MCL (ref 4.5–11.5)
WBC #/AREA URNS AUTO: NORMAL /HPF

## 2023-03-22 PROCEDURE — 1159F PR MEDICATION LIST DOCUMENTED IN MEDICAL RECORD: ICD-10-PCS | Mod: CPTII,,, | Performed by: INTERNAL MEDICINE

## 2023-03-22 PROCEDURE — 3078F PR MOST RECENT DIASTOLIC BLOOD PRESSURE < 80 MM HG: ICD-10-PCS | Mod: CPTII,,, | Performed by: INTERNAL MEDICINE

## 2023-03-22 PROCEDURE — 3074F SYST BP LT 130 MM HG: CPT | Mod: CPTII,,, | Performed by: INTERNAL MEDICINE

## 2023-03-22 PROCEDURE — 1159F MED LIST DOCD IN RCRD: CPT | Mod: CPTII,,, | Performed by: INTERNAL MEDICINE

## 2023-03-22 PROCEDURE — 4010F PR ACE/ARB THEARPY RXD/TAKEN: ICD-10-PCS | Mod: CPTII,,, | Performed by: INTERNAL MEDICINE

## 2023-03-22 PROCEDURE — 99395 PREV VISIT EST AGE 18-39: CPT | Mod: GZ,,, | Performed by: INTERNAL MEDICINE

## 2023-03-22 PROCEDURE — 4010F ACE/ARB THERAPY RXD/TAKEN: CPT | Mod: CPTII,,, | Performed by: INTERNAL MEDICINE

## 2023-03-22 PROCEDURE — 1160F PR REVIEW ALL MEDS BY PRESCRIBER/CLIN PHARMACIST DOCUMENTED: ICD-10-PCS | Mod: CPTII,,, | Performed by: INTERNAL MEDICINE

## 2023-03-22 PROCEDURE — 85025 COMPLETE CBC W/AUTO DIFF WBC: CPT | Performed by: INTERNAL MEDICINE

## 2023-03-22 PROCEDURE — 84443 ASSAY THYROID STIM HORMONE: CPT | Performed by: INTERNAL MEDICINE

## 2023-03-22 PROCEDURE — 99395 PR PREVENTIVE VISIT,EST,18-39: ICD-10-PCS | Mod: GZ,,, | Performed by: INTERNAL MEDICINE

## 2023-03-22 PROCEDURE — 3074F PR MOST RECENT SYSTOLIC BLOOD PRESSURE < 130 MM HG: ICD-10-PCS | Mod: CPTII,,, | Performed by: INTERNAL MEDICINE

## 2023-03-22 PROCEDURE — 36415 COLL VENOUS BLD VENIPUNCTURE: CPT | Mod: ,,, | Performed by: INTERNAL MEDICINE

## 2023-03-22 PROCEDURE — 80053 COMPREHEN METABOLIC PANEL: CPT | Performed by: INTERNAL MEDICINE

## 2023-03-22 PROCEDURE — 1160F RVW MEDS BY RX/DR IN RCRD: CPT | Mod: CPTII,,, | Performed by: INTERNAL MEDICINE

## 2023-03-22 PROCEDURE — 3008F PR BODY MASS INDEX (BMI) DOCUMENTED: ICD-10-PCS | Mod: CPTII,,, | Performed by: INTERNAL MEDICINE

## 2023-03-22 PROCEDURE — 80061 LIPID PANEL: CPT | Performed by: INTERNAL MEDICINE

## 2023-03-22 PROCEDURE — 36415 PR COLLECTION VENOUS BLOOD,VENIPUNCTURE: ICD-10-PCS | Mod: ,,, | Performed by: INTERNAL MEDICINE

## 2023-03-22 PROCEDURE — 81003 URINALYSIS AUTO W/O SCOPE: CPT | Performed by: INTERNAL MEDICINE

## 2023-03-22 PROCEDURE — 36415 COLL VENOUS BLD VENIPUNCTURE: CPT | Performed by: INTERNAL MEDICINE

## 2023-03-22 PROCEDURE — 3078F DIAST BP <80 MM HG: CPT | Mod: CPTII,,, | Performed by: INTERNAL MEDICINE

## 2023-03-22 PROCEDURE — 3008F BODY MASS INDEX DOCD: CPT | Mod: CPTII,,, | Performed by: INTERNAL MEDICINE

## 2023-03-22 RX ORDER — OFLOXACIN 3 MG/ML
SOLUTION/ DROPS OPHTHALMIC
COMMUNITY
Start: 2023-03-17 | End: 2023-12-18

## 2023-03-22 NOTE — PROGRESS NOTES
Colleen Hull MD   [id number] [facility] [place] [address]     Patient ID: [id number]     Chief Complaint: Medicare AWV        HPI:     Jocelin Longo is a 33 y.o. female here today for annual wellness exam.  She saw Dr Catalan last November and does annual work up with him. Her potassium was a little low. She is feeling ok just has been busy at work. She is trying to exercise. The weather has not been helping.       Subjective:     Review of Systems   HENT:  Positive for congestion.         Had some congestion 2 wks ago with the pollen. She also just had upper respiratory issues when she was positive for COVID   Eyes: Negative.    Respiratory: Negative.     Cardiovascular:  Positive for leg swelling.        Swelling hasn't been bad, rarely notices anything   Gastrointestinal: Negative.    Genitourinary: Negative.    Musculoskeletal: Negative.    Neurological: Negative.    Endo/Heme/Allergies: Negative.    Psychiatric/Behavioral:          Mood is doing good, she is still taking the medication and it's working well for her     Past Medical History:   Diagnosis Date    Anticoagulant long-term use     Asthma     COVID-19 5/15/2022    Hypertension     Hypothyroid     Mild aortic insufficiency     Pulmonary insufficiency     TOF (tetralogy of Fallot)     Ventricular tachycardia         Past Surgical History:   Procedure Laterality Date    CARDIAC SURGERY      PVR    DENTAL SURGERY      EYE SURGERY      TETRALOGY OF FALLOT REPAIR      TONSILLECTOMY         Family History   Problem Relation Age of Onset    No Known Problems Mother     No Known Problems Father     No Known Problems Sister     No Known Problems Brother     Hypertension Maternal Grandmother     Hypertension Maternal Grandfather     Heart disease Maternal Grandfather     Heart disease Paternal Grandmother     No Known Problems Paternal Grandfather     No Known Problems Maternal Aunt     Stroke Maternal Uncle     Hypertension Maternal Uncle     Fainting Maternal Uncle     No  Known Problems Paternal Aunt     No Known Problems Paternal Uncle     Anemia Neg Hx     Arrhythmia Neg Hx     Asthma Neg Hx     Clotting disorder Neg Hx     Heart attack Neg Hx     Heart failure Neg Hx     Hyperlipidemia Neg Hx     Atrial Septal Defect Neg Hx         Social History     Socioeconomic History    Marital status: Single   Tobacco Use    Smoking status: Never    Smokeless tobacco: Never   Substance and Sexual Activity    Alcohol use: Never     Comment: socially    Drug use: Never   Social History Narrative    Lives with parents and older brother.  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        Review of patient's allergies indicates:  No Known Allergies    Outpatient Medications Marked as Taking for the 3/22/23 encounter (Office Visit) with Colleen Hull MD   Medication Sig Dispense Refill    aspirin (ECOTRIN) 81 MG EC tablet Take 81 mg by mouth once daily.      hydroCHLOROthiazide (HYDRODIURIL) 25 MG tablet TAKE 1 TABLET BY MOUTH EVERY DAY 30 tablet 10    ibuprofen (ADVIL,MOTRIN) 800 MG tablet Take 800 mg by mouth every 8 (eight) hours as needed for Pain.      levothyroxine (SYNTHROID) 50 MCG tablet Take 1 tablet (50 mcg total) by mouth once daily. 90 tablet 1    losartan (COZAAR) 50 MG tablet TAKE ONE TABLET BY MOUTH TWICE DAILY 60 tablet 3    metoprolol succinate (TOPROL-XL) 50 MG 24 hr tablet TAKE 1 TABLET BY MOUTH EVERY DAY 30 tablet 6    ofloxacin (OCUFLOX) 0.3 % ophthalmic solution       potassium chloride (MICRO-K) 10 MEQ CpSR TAKE 1 CAPSULE BY MOUTH ONCE DAILY 30 capsule 5    sertraline (ZOLOFT) 25 MG tablet TAKE 1 TABLET BY MOUTH ONCE DAILY 30 tablet 3       Patient Care Team:  Colleen Hull MD as PCP - General (Internal Medicine)  MD Gustavo Hall MD as Consulting Physician (Obstetrics and Gynecology)  Mazin Catalan MD as Consulting Physician (Pediatric Cardiology)  Teena Beyer MD as Consulting Physician (Urology)  Ko Rudolph MD as Consulting Physician  "(Otolaryngology)       Objective:     /71 (BP Location: Left arm, Patient Position: Sitting, BP Method: Medium (Automatic))   Pulse (!) 54   Temp 97.6 °F (36.4 °C) (Oral)   Resp 18   Ht 5' 2" (1.575 m)   Wt 78.5 kg (173 lb)   LMP 02/14/2023 (Approximate)   SpO2 99%   BMI 31.64 kg/m²     Physical Exam  Vitals reviewed.   HENT:      Head: Normocephalic and atraumatic.      Right Ear: Tympanic membrane, ear canal and external ear normal.      Left Ear: Tympanic membrane, ear canal and external ear normal.      Mouth/Throat:      Mouth: Mucous membranes are moist.      Pharynx: No posterior oropharyngeal erythema.   Eyes:      Extraocular Movements: Extraocular movements intact.      Conjunctiva/sclera: Conjunctivae normal.      Pupils: Pupils are equal, round, and reactive to light.   Cardiovascular:      Rate and Rhythm: Bradycardia present.   Pulmonary:      Effort: Pulmonary effort is normal.      Breath sounds: Normal breath sounds. No wheezing.   Abdominal:      General: Abdomen is flat. Bowel sounds are normal.      Palpations: Abdomen is soft.   Musculoskeletal:         General: No tenderness or deformity. Normal range of motion.      Cervical back: Neck supple. No tenderness.   Skin:     General: Skin is warm and dry.      Findings: No bruising.   Neurological:      Mental Status: She is alert and oriented to person, place, and time. Mental status is at baseline.      Motor: No weakness.      Coordination: Coordination normal.      Gait: Gait normal.   Psychiatric:         Mood and Affect: Mood normal.         Behavior: Behavior normal.         Thought Content: Thought content normal.         Judgment: Judgment normal.      Comments: Increased stuttering today       Documentation Only on 01/18/2023   Component Date Value    HPV DNA 05/26/2022 None Detected    Lab Visit on 12/14/2022   Component Date Value    Sodium Level 12/14/2022 140     Potassium Level 12/14/2022 3.7     Chloride 12/14/2022 103 "     Carbon Dioxide 12/14/2022 26     Glucose Level 12/14/2022 77     Blood Urea Nitrogen 12/14/2022 12.3     Creatinine 12/14/2022 0.74     BUN/Creatinine Ratio 12/14/2022 17     Calcium Level Total 12/14/2022 9.3     Anion Gap 12/14/2022 11.0     eGFR 12/14/2022 >60    Documentation Only on 12/01/2022   Component Date Value    PAP Recommendation Exter* 05/19/2021 Pap in 3 years     Pap 05/19/2021 Negative for intraephithelial lesion or malignancy        Assessment/Problems:       ICD-10-CM ICD-9-CM   1. Wellness examination  Z00.00 V70.0   2. Essential hypertension  I10 401.9   3. Adult congenital heart disease  Q24.9 746.9   4. S/P pulmonary valve replacement with bioprosthetic valve  Z95.3 V42.2   5. Tetralogy of Fallot s/p repair  Z87.74 V13.65   6. Mixed anxiety depressive disorder  F41.8 300.4   7. Acquired hypothyroidism  E03.9 244.9        Plan:     1. Wellness examination  -     CBC Auto Differential  -     Comprehensive Metabolic Panel  -     Lipid Panel  -     TSH  -     Urinalysis, Reflex to Urine Culture    2. Essential hypertension  Comments:  Continue Losartan/ HCTZ doing well but must stay on potassium with it.   Orders:  -     Comprehensive Metabolic Panel  -     Lipid Panel  -     Urinalysis, Reflex to Urine Culture    3. Adult congenital heart disease  Comments:  Followed at [facility] by Dr Catalan.  Orders:  -     CBC Auto Differential    4. S/P pulmonary valve replacement with bioprosthetic valve    5. Tetralogy of Fallot s/p repair  Comments:  Last visit with Dr Catalan was in November. Reviewed report.     6. Mixed anxiety depressive disorder  Comments:  Doing well on Zoloft.   Orders:  -     TSH    7. Acquired hypothyroidism  Comments:  Levels due  Orders:  -     TSH             Follow up in about 6 months (around 9/22/2023) for follow up. In addition to their scheduled follow up, the patient has also been instructed to follow up on as needed basis.     Signature:  Colleen Hull MD  [facility]  [address] [id number]

## 2023-06-08 RX ORDER — LEVOTHYROXINE SODIUM 50 UG/1
TABLET ORAL
Qty: 90 TABLET | Refills: 3 | Status: SHIPPED | OUTPATIENT
Start: 2023-06-08

## 2023-06-21 RX ORDER — SERTRALINE HYDROCHLORIDE 25 MG/1
TABLET, FILM COATED ORAL
Qty: 30 TABLET | Refills: 5 | Status: SHIPPED | OUTPATIENT
Start: 2023-06-21 | End: 2023-12-21

## 2023-06-21 RX ORDER — METOPROLOL SUCCINATE 50 MG/1
TABLET, EXTENDED RELEASE ORAL
Qty: 30 TABLET | Refills: 6 | Status: SHIPPED | OUTPATIENT
Start: 2023-06-21 | End: 2024-01-25 | Stop reason: SDUPTHER

## 2023-07-07 RX ORDER — LOSARTAN POTASSIUM 50 MG/1
TABLET ORAL
Qty: 60 TABLET | Refills: 11 | Status: SHIPPED | OUTPATIENT
Start: 2023-07-07

## 2023-09-12 RX ORDER — POTASSIUM CHLORIDE 750 MG/1
CAPSULE, EXTENDED RELEASE ORAL
Qty: 30 CAPSULE | Refills: 5 | Status: SHIPPED | OUTPATIENT
Start: 2023-09-12

## 2023-09-13 ENCOUNTER — PATIENT MESSAGE (OUTPATIENT)
Dept: ADMINISTRATIVE | Facility: OTHER | Age: 33
End: 2023-09-13
Payer: MEDICAID

## 2023-09-20 ENCOUNTER — TELEPHONE (OUTPATIENT)
Dept: PRIMARY CARE CLINIC | Facility: CLINIC | Age: 33
End: 2023-09-20
Payer: MEDICAID

## 2023-09-20 RX ORDER — HYDROCHLOROTHIAZIDE 25 MG/1
25 TABLET ORAL
Qty: 30 TABLET | Refills: 10 | Status: SHIPPED | OUTPATIENT
Start: 2023-09-20

## 2023-09-20 NOTE — TELEPHONE ENCOUNTER
1. Are there any outstanding tasks in patient's chart? (Ex. Labs, MMG)?-    2. Do we have any outstanding/Pending referrals?-    3. Has the patient been seen in an ER, Urgent Care or been admitted to the hospital since last office visit with our office?-    4. Has the patient seen any other health care provider (doctors) since last visit?-    5. Has the patient had any blood work or x-rays done since last visit?-    FOR CLINICAL USE ONLY (DO NOT ASK PATIENT)    6. Is the patients pneumonia vaccine current?  Prevnar 13:-  Pneumonia 20:-  Pneumovax 23:-    7. When was the patient's Colonoscopy?-    8. When was the patient's last Mammogram?-    9.When was the patients last cervical screening/PAP smear?5/26/22    10. When was the patient's last bone scan?-    11. When was the patient's last diabetic screening?  A1C:-  Mirco:-  Eye Exam: -

## 2023-09-27 ENCOUNTER — OFFICE VISIT (OUTPATIENT)
Dept: PRIMARY CARE CLINIC | Facility: CLINIC | Age: 33
End: 2023-09-27
Payer: MEDICARE

## 2023-09-27 VITALS
HEART RATE: 53 BPM | TEMPERATURE: 98 F | WEIGHT: 165 LBS | BODY MASS INDEX: 30.36 KG/M2 | SYSTOLIC BLOOD PRESSURE: 135 MMHG | HEIGHT: 62 IN | RESPIRATION RATE: 15 BRPM | DIASTOLIC BLOOD PRESSURE: 83 MMHG | OXYGEN SATURATION: 99 %

## 2023-09-27 DIAGNOSIS — E03.9 ACQUIRED HYPOTHYROIDISM: ICD-10-CM

## 2023-09-27 DIAGNOSIS — I10 ESSENTIAL HYPERTENSION: Primary | ICD-10-CM

## 2023-09-27 DIAGNOSIS — Z87.74 TETRALOGY OF FALLOT S/P REPAIR: ICD-10-CM

## 2023-09-27 PROCEDURE — 3075F SYST BP GE 130 - 139MM HG: CPT | Mod: CPTII,,, | Performed by: INTERNAL MEDICINE

## 2023-09-27 PROCEDURE — 4010F PR ACE/ARB THEARPY RXD/TAKEN: ICD-10-PCS | Mod: CPTII,,, | Performed by: INTERNAL MEDICINE

## 2023-09-27 PROCEDURE — 3008F PR BODY MASS INDEX (BMI) DOCUMENTED: ICD-10-PCS | Mod: CPTII,,, | Performed by: INTERNAL MEDICINE

## 2023-09-27 PROCEDURE — 99214 OFFICE O/P EST MOD 30 MIN: CPT | Mod: ,,, | Performed by: INTERNAL MEDICINE

## 2023-09-27 PROCEDURE — 1160F RVW MEDS BY RX/DR IN RCRD: CPT | Mod: CPTII,,, | Performed by: INTERNAL MEDICINE

## 2023-09-27 PROCEDURE — 3079F PR MOST RECENT DIASTOLIC BLOOD PRESSURE 80-89 MM HG: ICD-10-PCS | Mod: CPTII,,, | Performed by: INTERNAL MEDICINE

## 2023-09-27 PROCEDURE — 3075F PR MOST RECENT SYSTOLIC BLOOD PRESS GE 130-139MM HG: ICD-10-PCS | Mod: CPTII,,, | Performed by: INTERNAL MEDICINE

## 2023-09-27 PROCEDURE — 1160F PR REVIEW ALL MEDS BY PRESCRIBER/CLIN PHARMACIST DOCUMENTED: ICD-10-PCS | Mod: CPTII,,, | Performed by: INTERNAL MEDICINE

## 2023-09-27 PROCEDURE — 1159F MED LIST DOCD IN RCRD: CPT | Mod: CPTII,,, | Performed by: INTERNAL MEDICINE

## 2023-09-27 PROCEDURE — 4010F ACE/ARB THERAPY RXD/TAKEN: CPT | Mod: CPTII,,, | Performed by: INTERNAL MEDICINE

## 2023-09-27 PROCEDURE — 3079F DIAST BP 80-89 MM HG: CPT | Mod: CPTII,,, | Performed by: INTERNAL MEDICINE

## 2023-09-27 PROCEDURE — 99214 PR OFFICE/OUTPT VISIT, EST, LEVL IV, 30-39 MIN: ICD-10-PCS | Mod: ,,, | Performed by: INTERNAL MEDICINE

## 2023-09-27 PROCEDURE — 1159F PR MEDICATION LIST DOCUMENTED IN MEDICAL RECORD: ICD-10-PCS | Mod: CPTII,,, | Performed by: INTERNAL MEDICINE

## 2023-09-27 PROCEDURE — 3008F BODY MASS INDEX DOCD: CPT | Mod: CPTII,,, | Performed by: INTERNAL MEDICINE

## 2023-09-27 RX ORDER — CLOTRIMAZOLE AND BETAMETHASONE DIPROPIONATE 10; .64 MG/G; MG/G
CREAM TOPICAL 2 TIMES DAILY
Qty: 45 G | Refills: 1 | Status: SHIPPED | OUTPATIENT
Start: 2023-09-27

## 2023-09-27 NOTE — PROGRESS NOTES
Colleen Hull MD   [address] [facility] [place] [address]     Patient ID: [id number]     Chief Complaint: 6 Months follow up for HTN        HPI:     Jocelin Longo is a 33 y.o. female here today for a follow up of HTN, Hypothyroid Anxiety and corrected Tetrology of Fallot. She's been busy at work. No other complaints today.       Subjective:     Review of Systems   Constitutional:         Weight has come back down some even without her consistently exercising. Her parents did put a stationary bike in her bedroom.   Respiratory: Negative.     Cardiovascular:  Positive for leg swelling.        It's not much swelling, she has visit due in Nov   Gastrointestinal: Negative.    Genitourinary: Negative.    Skin:  Positive for rash.        R foot it will scale and peel   Psychiatric/Behavioral:          Mood is good       Past Medical History:   Diagnosis Date    Anticoagulant long-term use     Asthma     COVID-19 5/15/2022    Hypertension     Hypothyroid     Mild aortic insufficiency     Pulmonary insufficiency     TOF (tetralogy of Fallot)     Ventricular tachycardia         Past Surgical History:   Procedure Laterality Date    CARDIAC SURGERY      PVR    DENTAL SURGERY      EYE SURGERY      TETRALOGY OF FALLOT REPAIR      TONSILLECTOMY         Family History   Problem Relation Age of Onset    No Known Problems Mother     No Known Problems Father     No Known Problems Sister     No Known Problems Brother     Hypertension Maternal Grandmother     Hypertension Maternal Grandfather     Heart disease Maternal Grandfather     Heart disease Paternal Grandmother     No Known Problems Paternal Grandfather     No Known Problems Maternal Aunt     Stroke Maternal Uncle     Hypertension Maternal Uncle     Fainting Maternal Uncle     Coronary artery disease Maternal Uncle     Epilepsy Maternal Uncle     No Known Problems Paternal Aunt     No Known Problems Paternal Uncle     Anemia Neg Hx     Arrhythmia Neg Hx     Asthma Neg Hx     Clotting  "disorder Neg Hx     Heart attack Neg Hx     Heart failure Neg Hx     Hyperlipidemia Neg Hx     Atrial Septal Defect Neg Hx         Social History     Socioeconomic History    Marital status: Single   Tobacco Use    Smoking status: Never    Smokeless tobacco: Never   Substance and Sexual Activity    Alcohol use: Never     Comment: socially    Drug use: Never   Social History Narrative    Lives with parents and older brother.  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        Review of patient's allergies indicates:  No Known Allergies    Outpatient Medications Marked as Taking for the 9/27/23 encounter (Office Visit) with Colleen Hull MD   Medication Sig Dispense Refill    aspirin (ECOTRIN) 81 MG EC tablet Take 81 mg by mouth once daily.      hydroCHLOROthiazide (HYDRODIURIL) 25 MG tablet TAKE 1 TABLET BY MOUTH ONCE DAILY 30 tablet 10    ibuprofen (ADVIL,MOTRIN) 800 MG tablet Take 800 mg by mouth every 8 (eight) hours as needed for Pain.      levothyroxine (SYNTHROID) 50 MCG tablet TAKE 1 TABLET BY MOUTH ONCE DAILY. 90 tablet 3    losartan (COZAAR) 50 MG tablet TAKE ONE TABLET BY MOUTH TWICE DAILY 60 tablet 11    metoprolol succinate (TOPROL-XL) 50 MG 24 hr tablet TAKE 1 TABLET BY MOUTH ONCE DAILY 30 tablet 6    potassium chloride (MICRO-K) 10 MEQ CpSR TAKE 1 CAPSULE BY MOUTH ONCE DAILY 30 capsule 5    sertraline (ZOLOFT) 25 MG tablet TAKE 1 TABLET BY MOUTH ONCE DAILY 30 tablet 5       Patient Care Team:  Colleen Hull MD as PCP - General (Internal Medicine)  Colleen Hull MD King, Mika M., MD as Consulting Physician (Obstetrics and Gynecology)  Mazin Catalan MD as Consulting Physician (Pediatric Cardiology)  Teena Beyer MD as Consulting Physician (Urology)  Ko Rudolph MD as Consulting Physician (Otolaryngology)       Objective:     /83   Pulse (!) 53   Temp 98.3 °F (36.8 °C) (Oral)   Resp 15   Ht 5' 2" (1.575 m)   Wt 74.8 kg (165 lb)   LMP 08/31/2023   SpO2 99%   BMI 30.18 " kg/m²     Physical Exam  Constitutional:       Appearance: She is not ill-appearing.   HENT:      Head: Normocephalic and atraumatic.      Right Ear: Tympanic membrane, ear canal and external ear normal.      Left Ear: Tympanic membrane, ear canal and external ear normal.   Cardiovascular:      Rate and Rhythm: Regular rhythm. Bradycardia present.      Heart sounds:      No gallop.   Pulmonary:      Effort: Pulmonary effort is normal.      Breath sounds: Normal breath sounds. No wheezing.   Skin:     Findings: Erythema and rash present.      Comments: Scalin g on the lower foot and sole on right   Neurological:      Mental Status: She is alert.   Psychiatric:         Mood and Affect: Mood normal.         Behavior: Behavior normal.         Thought Content: Thought content normal.         Judgment: Judgment normal.      Comments: Intermittent stuttering, less than in past               Assessment/Plan:     1. Essential hypertension  Comments:  Will get lab in March    2. Tetralogy of Fallot s/p repair  Comments:  Due in November with cardiology in [place].     3. Acquired hypothyroidism  Comments:  Lab with annual in March    Other orders  -     clotrimazole-betamethasone 1-0.05% (LOTRISONE) cream; Apply topically 2 (two) times daily.  Dispense: 45 g; Refill: 1             Follow up in about 6 months (around 3/27/2024) for Wellness. In addition to their scheduled follow up, the patient has also been instructed to follow up on as needed basis.     Signature:  Colleen Hull MD  [facility]  [address] [id number]

## 2023-10-03 ENCOUNTER — PATIENT MESSAGE (OUTPATIENT)
Dept: CARDIOLOGY | Facility: CLINIC | Age: 33
End: 2023-10-03
Payer: MEDICAID

## 2023-10-08 ENCOUNTER — PATIENT MESSAGE (OUTPATIENT)
Dept: PRIMARY CARE CLINIC | Facility: CLINIC | Age: 33
End: 2023-10-08
Payer: MEDICAID

## 2023-10-08 RX ORDER — AZITHROMYCIN 250 MG/1
TABLET, FILM COATED ORAL
Qty: 6 TABLET | Refills: 0 | Status: SHIPPED | OUTPATIENT
Start: 2023-10-08 | End: 2023-10-13

## 2023-10-24 ENCOUNTER — PATIENT MESSAGE (OUTPATIENT)
Dept: CARDIOLOGY | Facility: CLINIC | Age: 33
End: 2023-10-24
Payer: MEDICAID

## 2023-10-27 ENCOUNTER — PATIENT MESSAGE (OUTPATIENT)
Dept: PRIMARY CARE CLINIC | Facility: CLINIC | Age: 33
End: 2023-10-27
Payer: MEDICAID

## 2023-10-27 DIAGNOSIS — I10 ESSENTIAL HYPERTENSION: Primary | ICD-10-CM

## 2023-10-27 DIAGNOSIS — Z79.899 LONG TERM CURRENT USE OF DIURETIC: ICD-10-CM

## 2023-11-16 ENCOUNTER — PATIENT MESSAGE (OUTPATIENT)
Dept: PRIMARY CARE CLINIC | Facility: CLINIC | Age: 33
End: 2023-11-16
Payer: MEDICAID

## 2023-11-22 ENCOUNTER — PATIENT MESSAGE (OUTPATIENT)
Dept: PRIMARY CARE CLINIC | Facility: CLINIC | Age: 33
End: 2023-11-22
Payer: MEDICAID

## 2023-11-29 ENCOUNTER — LAB VISIT (OUTPATIENT)
Dept: LAB | Facility: HOSPITAL | Age: 33
End: 2023-11-29
Attending: INTERNAL MEDICINE
Payer: MEDICARE

## 2023-11-29 ENCOUNTER — TELEPHONE (OUTPATIENT)
Dept: PRIMARY CARE CLINIC | Facility: CLINIC | Age: 33
End: 2023-11-29
Payer: MEDICARE

## 2023-11-29 DIAGNOSIS — Z79.899 LONG TERM CURRENT USE OF DIURETIC: ICD-10-CM

## 2023-11-29 DIAGNOSIS — I10 ESSENTIAL HYPERTENSION: ICD-10-CM

## 2023-11-29 LAB
ANION GAP SERPL CALC-SCNC: 10 MEQ/L
BUN SERPL-MCNC: 11.4 MG/DL (ref 7–18.7)
CALCIUM SERPL-MCNC: 9.6 MG/DL (ref 8.4–10.2)
CHLORIDE SERPL-SCNC: 102 MMOL/L (ref 98–107)
CO2 SERPL-SCNC: 30 MMOL/L (ref 22–29)
CREAT SERPL-MCNC: 0.77 MG/DL (ref 0.55–1.02)
CREAT/UREA NIT SERPL: 15
GFR SERPLBLD CREATININE-BSD FMLA CKD-EPI: >60 MLS/MIN/1.73/M2
GLUCOSE SERPL-MCNC: 80 MG/DL (ref 74–100)
POTASSIUM SERPL-SCNC: 4.3 MMOL/L (ref 3.5–5.1)
SODIUM SERPL-SCNC: 142 MMOL/L (ref 136–145)

## 2023-11-29 PROCEDURE — 80048 BASIC METABOLIC PNL TOTAL CA: CPT

## 2023-11-29 PROCEDURE — 36415 COLL VENOUS BLD VENIPUNCTURE: CPT

## 2023-11-29 NOTE — TELEPHONE ENCOUNTER
----- Message from Colleen Hull MD sent at 11/29/2023  3:50 PM CST -----  Kidney and potassium look good on medications.

## 2023-12-04 ENCOUNTER — CLINICAL SUPPORT (OUTPATIENT)
Dept: PEDIATRIC CARDIOLOGY | Facility: CLINIC | Age: 33
End: 2023-12-04
Payer: MEDICARE

## 2023-12-04 DIAGNOSIS — I35.1 AORTIC VALVE INSUFFICIENCY, ETIOLOGY OF CARDIAC VALVE DISEASE UNSPECIFIED: ICD-10-CM

## 2023-12-04 DIAGNOSIS — Q24.9 CONGENITAL MALFORMATION OF HEART, UNSPECIFIED: ICD-10-CM

## 2023-12-04 DIAGNOSIS — Z87.74 TETRALOGY OF FALLOT S/P REPAIR: Primary | ICD-10-CM

## 2023-12-04 DIAGNOSIS — Z87.74 TETRALOGY OF FALLOT S/P REPAIR: ICD-10-CM

## 2023-12-04 DIAGNOSIS — Q24.9 ADULT CONGENITAL HEART DISEASE: ICD-10-CM

## 2023-12-04 DIAGNOSIS — I47.20 VENTRICULAR TACHYCARDIA: ICD-10-CM

## 2023-12-04 DIAGNOSIS — Z95.3 S/P PULMONARY VALVE REPLACEMENT WITH BIOPROSTHETIC VALVE: ICD-10-CM

## 2023-12-16 NOTE — PROGRESS NOTES
12/18/2023     RE:JOCELIN LONGO BRIELLE  [facility]# [id number]    Colleen Hull MD   [address] [facility] [id number] [facility]    Dear Dr. Hull:    I had the pleasure of seeing Jocelin in our adult congenital cardiology clinic in [place]. Jocelin Longo is a 33 y.o. female with repaired tetralogy of Fallot.  To summarize, her diagnoses are as follows:   Diagnoses:  1. Tetralogy of Fallot status post repair.  Normal biventricular function with minimal RV enlargement.  2. 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 Palpitations with a history of ventricular arrhythmia on Holter monitor and a negative EP study.    4. Mild aortic insufficiency and mild aortic root enlargement  5. Choking on food - chronic issue and not worsened.  No vocal cord dysfunction noted on ENT evaluation, but she does have a mild glottic gap.  6. Consider 22q11 deletion.  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 Significant hypertension.  Not due to her congenital heart disease.  Much improved.  9. Very difficult echo imaging    Discussion:  1. Good dental hygiene.  She definitely needs antibiotic prophylaxis before dental work.  2. continue current medications including aspirin, losartan, metoprolol.    3. follow up in 1 year with echo, ekg, holter.  Repeat MRI in 2 years.  4. Healthy, low salt diet.  Regular low intensity exercise.  5. Contact me with any issues.  Any neuro changes, shortness of breath, edema, etc... should prompt immediate evaluation.  6. They state that she gets regular labs every 6 months and will be scheduled for this by December.    7. Holter monitor today  8. Follow up with PCP    Discussion:  From a cardiac standpoint, she looks great.  She will  need her pulmonary valve replaced in the future, but she should be a good candidate for a catheter based valve.    Interval history:    Overall, she has done very well since I last saw her a year ago.  No worsening dyspnea on exertion, shortness of breath, chest pain, syncope, near syncope, cyanosis, or edema.  She does have occasional very brief palpitations.  These occur at rest.  This is not worsened compared to years past.      10/8/18.  She had a renal US and abdominal CTA that showed no evidence of kidney disease or renal artery stenosis, and her creatinine and thyroid studies were normal.      She has problems with both feet.  She has hypothyroidism.  On 12/17/14, she underwent the following surgical procedure:  Redo median sternotomy, resection of right ventricular muscle bundles, pulmonary valve replacement with a 23 mm Trifecta valve and augmentation of the main pulmonary artery.  The surgery was complicated by adherence of the heart to the sternum, making dissection difficult.  They accessed the RFA for bypass, but were not able to get in the vein (chronic occlusion).  She ultimately did quite well, however, and was discharged home after 4 days.      Years ago, I had her seen in ENT clinic.  The conclusions from that visit were as follows:  In summary, this patient has normal vocal cord motion bilaterally, ruling out any functional impairment from a paralyzed vocal cord given her remote history of TOF surgery. She does have a small glottic gap and this may contribute to her hoarseness and intermittent throat clearing with PO liquid. She tolerated PO liquid in the exam room without incident. It is likely that she is experiencing intermittent bouts of laryngeal penetration, not aspiration given her clinical picture. Discussed the only way to know for sure is with a MBSS. However, given the rarity of these episodes(Once every 1-2 weeks), it is not likely that we will catch any abnormality on that test. I  have discussed a course of observation, as there is no immediate concern for aspiration. Should her symptoms of throat clearing with PO liquid intake increase in nature, we can then proceed with a MBSS to further characterize the nature of her dysphagia.     Several years ago, Jocelin was admitted to the hospital for evaluation after Holter demonstrated runs of ventricular tachycardia. During this admission, she had electrophysiology study which demonstrated no inducible ventricular arrhythmia, and she was started on a beta blocker.     The review of systems is as noted above. It is otherwise negative for other symptoms related to the general, neurological, psychiatric, endocrine, gastrointestinal, genitourinary, respiratory, dermatologic, musculoskeletal, hematologic, and immunologic systems.     Past Medical History:   Diagnosis Date    Anticoagulant long-term use     Asthma     COVID-19 5/15/2022    Hypertension     Hypothyroid     Mild aortic insufficiency     Pulmonary insufficiency     TOF (tetralogy of Fallot)     Ventricular tachycardia      Past Surgical History:   Procedure Laterality Date    CARDIAC SURGERY      PVR    DENTAL SURGERY      EYE SURGERY      TETRALOGY OF FALLOT REPAIR      TONSILLECTOMY       Family History   Problem Relation Age of Onset    No Known Problems Mother     No Known Problems Father     No Known Problems Sister     No Known Problems Brother     Hypertension Maternal Grandmother     Hypertension Maternal Grandfather     Heart disease Maternal Grandfather     Heart disease Paternal Grandmother     No Known Problems Paternal Grandfather     No Known Problems Maternal Aunt     Stroke Maternal Uncle     Hypertension Maternal Uncle     Fainting Maternal Uncle     Coronary artery disease Maternal Uncle     Epilepsy Maternal Uncle     No Known Problems Paternal Aunt     No Known Problems Paternal Uncle     Anemia Neg Hx     Arrhythmia Neg Hx     Asthma Neg Hx     Clotting disorder Neg Hx   "   Heart attack Neg Hx     Heart failure Neg Hx     Hyperlipidemia Neg Hx     Atrial Septal Defect Neg Hx      Social History     Socioeconomic History    Marital status: Single   Tobacco Use    Smoking status: Never    Smokeless tobacco: Never   Substance and Sexual Activity    Alcohol use: Never     Comment: socially    Drug use: Never   Social History Narrative    Lives with parents and older brother.  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      Current Outpatient Medications on File Prior to Visit   Medication Sig Dispense Refill    aspirin (ECOTRIN) 81 MG EC tablet Take 81 mg by mouth once daily.      clotrimazole-betamethasone 1-0.05% (LOTRISONE) cream Apply topically 2 (two) times daily. (Patient taking differently: Apply topically daily as needed.) 45 g 1    hydroCHLOROthiazide (HYDRODIURIL) 25 MG tablet TAKE 1 TABLET BY MOUTH ONCE DAILY 30 tablet 10    ibuprofen (ADVIL,MOTRIN) 800 MG tablet Take 800 mg by mouth every 8 (eight) hours as needed for Pain.      levothyroxine (SYNTHROID) 50 MCG tablet TAKE 1 TABLET BY MOUTH ONCE DAILY. 90 tablet 3    losartan (COZAAR) 50 MG tablet TAKE ONE TABLET BY MOUTH TWICE DAILY 60 tablet 11    metoprolol succinate (TOPROL-XL) 50 MG 24 hr tablet TAKE 1 TABLET BY MOUTH ONCE DAILY 30 tablet 6    potassium chloride (MICRO-K) 10 MEQ CpSR TAKE 1 CAPSULE BY MOUTH ONCE DAILY 30 capsule 5    sertraline (ZOLOFT) 25 MG tablet TAKE 1 TABLET BY MOUTH ONCE DAILY 30 tablet 5    [DISCONTINUED] ofloxacin (OCUFLOX) 0.3 % ophthalmic solution        No current facility-administered medications on file prior to visit.     Review of patient's allergies indicates:  No Known Allergies     Vitals:    12/18/23 1041   BP: (!) 111/59   BP Location: Right arm   Patient Position: Sitting   BP Method: Medium (Automatic)   Pulse: 60   Resp: 18   SpO2: 98%   Weight: 78 kg (172 lb)   Height: 5' 2" (1.575 m)       GENERAL: Jocelin is a well-developed well-nourished female. She moves symmetrically. She does have a " stutter.   HEENT: No dysmorphic features are noted. Visual tracking is normal with symmetric grimace. Teeth are in good repair. No lymphadenopathy is noted.    CHEST: There is a well healed median sternotomy scar and a well healed left thoracotomy scar.  CARDIAC: The precordium is quiet. S1 is single with a split S2. There is a grade 1/6 systolic ejection murmur and a 1-2/6 soft, low pitched diastolic murmur at the RUSB.  ABDOMEN: The abdomen is soft with no hepatosplenomegaly.  EXTREMITIES: Extremities are warm and well perfused. Pulses are good in all extremities with no edema clubbing or cyanosis noted.    I personally reviewed the following tests performed today and my interpretation follows:  Echo 12/4/23:  Very poor imaging. Normal left ventricle structure and size. Aortic valve poorly visualized. No stenosis. Aortic insufficiency noted in previous studies, likely mild based on today's imaging Possible tiny aorto-pulmonary collateral noted from the proximal descending aorta. Dyskinetic septal motion, but overall normal appearing LV systolic function. Right ventricle not well visualized, but appears to be normal size with good systolic function There appears to be mild proximal RPA obstruction (diameter about 7-8mm), mild with peak velocity 2 m/s. Distal RPA free of obstruction. Bioprosthetic valve in pulmonary position not visualized. No obvious stenosis or insufficiency, but very poorly imaged.    An EKG performed in clinic today reveals sinus rhythm with a rate of around 60.  A right bundle branch block is present with QRS 180ms.  There is no ectopy.  EKG is unchanged.    MRI 11/10/22:  Top normal size of the right atrium.  Tricuspid insufficiency noted.   Mildly increased right ventricular volumes. (RVEDV 117 cc/m2 for BSA, RVESV 62 cc/m2 for BSA).  Slight increased in RV volumes for BSA in the setting of small decreased in BSA when compared to MRI 2020. RVEF 47 %.  RVOT somewhat obscured by artifact from  the sternum.  Well-seated bioprosthetic pulmonary valve. Pulmonary insufficiency with regurgitant fraction of 21% and regurgitation volume of 13 cc. Peak velocity 2m/sec (no significant change).   Normal size of main and branch pulmonary arteries.   Normal left ventricular volume with LVEF 58 %.  Aortic insufficiency, regurgitation fraction of 20%, regurgitant volume of 14 cc (increased when compared to prior MRIs).   Top normal size of the aortic root with mildly dilated ascending aorta. No change in size of the ascending aorta when compared to MRIs.    Lab Results   Component Value Date    WBC 5.6 03/22/2023    HGB 13.0 03/22/2023    HCT 41.1 03/22/2023    MCV 92.8 03/22/2023     03/22/2023       CMP  Sodium   Date Value Ref Range Status   01/02/2015 140 136 - 145 mmol/L Final     Sodium Level   Date Value Ref Range Status   11/29/2023 142 136 - 145 mmol/L Final     Potassium   Date Value Ref Range Status   01/02/2015 4.8 3.5 - 5.1 mmol/L Final     Potassium Level   Date Value Ref Range Status   11/29/2023 4.3 3.5 - 5.1 mmol/L Final     Chloride   Date Value Ref Range Status   01/02/2015 105 95 - 110 mmol/L Final     CO2   Date Value Ref Range Status   01/02/2015 25 23 - 29 mmol/L Final     Carbon Dioxide   Date Value Ref Range Status   11/29/2023 30 (H) 22 - 29 mmol/L Final     Glucose   Date Value Ref Range Status   01/02/2015 82 70 - 110 mg/dL Final     BUN   Date Value Ref Range Status   01/02/2015 8 6 - 20 mg/dL Final     Blood Urea Nitrogen   Date Value Ref Range Status   11/29/2023 11.4 7.0 - 18.7 mg/dL Final     Creatinine   Date Value Ref Range Status   11/29/2023 0.77 0.55 - 1.02 mg/dL Final   01/02/2015 0.8 0.5 - 1.4 mg/dL Final     Calcium   Date Value Ref Range Status   01/02/2015 9.4 8.7 - 10.5 mg/dL Final     Calcium Level Total   Date Value Ref Range Status   11/29/2023 9.6 8.4 - 10.2 mg/dL Final     Total Protein   Date Value Ref Range Status   12/30/2014 6.4 6.0 - 8.4 g/dL Final      Albumin   Date Value Ref Range Status   12/30/2014 3.1 (L) 3.5 - 5.2 g/dL Final     Albumin Level   Date Value Ref Range Status   03/22/2023 3.9 3.5 - 5.0 g/dL Final     Total Bilirubin   Date Value Ref Range Status   12/30/2014 0.9 0.1 - 1.0 mg/dL Final     Comment:     For infants and newborns, interpretation of results should be based  on gestational age, weight and in agreement with clinical  observations.  Premature Infant recommended reference ranges:  Up to 24 hours.............<8.0 mg/dL  Up to 48 hours............<12.0 mg/dL  3-5 days..................<15.0 mg/dL  6-29 days.................<15.0 mg/dL       Bilirubin Total   Date Value Ref Range Status   03/22/2023 0.8 <=1.5 mg/dL Final     Alkaline Phosphatase   Date Value Ref Range Status   03/22/2023 53 40 - 150 unit/L Final   12/30/2014 79 55 - 135 U/L Final     AST   Date Value Ref Range Status   12/30/2014 21 10 - 40 U/L Final     Aspartate Aminotransferase   Date Value Ref Range Status   03/22/2023 26 5 - 34 unit/L Final     ALT   Date Value Ref Range Status   12/30/2014 18 10 - 44 U/L Final     Alanine Aminotransferase   Date Value Ref Range Status   03/22/2023 26 0 - 55 unit/L Final     Anion Gap   Date Value Ref Range Status   01/02/2015 10 8 - 16 mmol/L Final     eGFR if    Date Value Ref Range Status   01/02/2015 >60.0 >60 mL/min/1.73 m^2 Final     eGFR if non    Date Value Ref Range Status   01/02/2015 >60.0 >60 mL/min/1.73 m^2 Final     Comment:     Calculation used to obtain the estimated glomerular filtration  rate (eGFR) is the CKD-EPI equation. Since race is unknown   in our information system, the eGFR values for   -American and Non--American patients are given   for each creatinine result.       Estimated GFR-Non    Date Value Ref Range Status   03/23/2022 >60       BNP   Date Value Ref Range Status   12/30/2014 103 (H) 0 - 99 pg/mL Final     Comment:     Values of less  than 100 pg/ml are consistent with non-CHF populations.          Cardiac MRI October 1, 2020:  Top normal, mildly increased right ventricular volumes. (RVEDV 101 cc/m2 for BSA, RVESV 54 cc/m2 for BSA).  Slight decrease in RV volumes for BSA in the setting of increased BSA when compared to MRI 2016. RVEF 47 %.  Normal left ventricular volume with LVEF 62 %.  RVOT obscured by artifact from the sternum.  Well-seated bioprosthetic pulmonary valve. Pulmonary insufficiency with regurgitant fraction of 19% and regurgitation volume of 13 cc. Peak velocity 1.9m/sec.   Normal size of main and branch pulmonary arteries.   Aortic insufficiency, regurgitant fraction 3%, regurgitant volume 2 cc.   Top normal size of the aortic root with mildly dilated ascending aorta. No change in size of the ascending aorta when compared to MRI 2016.  Tricuspid valve regurgitation noted.    CPX testing 2016:  4) The PkVO2 was 20.6 ml/kg/min which is 55% of predicted equating to a functional capacity of 5.9 METS indicating moderate functional impairment.   CONCLUSIONS   CPX Conclusions:  Moderate functional impairment associated with a normal breathing reserve, normal oxygen saturation, an adequate effort, and a borderline reduced AT. These findings are indicative of functional impairment secondary to circulatory insufficiency.     Cardiac MRI 12/2016:  Conclusion:   25 yo with tetralogy of Fallot s/p repair, s/p pulmonary valve replacement.   1. The right ventricle end diastolic volume is top normal, end systolic volume is increased. (RVEDV 110 cc/m2 for BSA, RVESV 65 cc/m2 for BSA).  RVEF 41%.  2. Normal left ventricular size with LVEF 60 %.  3. There is a pulmonary valve prosthesis. The RVOT was difficult to visualize due to artifact from sternal wires. Pulmonary insufficiency, regurgitant fraction 14%. Peak velocity 1.7 m/sec.  4. Normal size of main and RPA, mild LPA hypoplasia.   5. Aortic insufficiency, regurgitant fraction 11%,  regurgitant volume 7 cc.   6. Dilated ascending aorta, meaurements as above.   7. Right atrial enlargement.   8. Tricuspid regurgitation noted.     Sincerely,    Mazin Catalan MD  Pediatric Cardiology  Adult Congenital Heart Disease  [facility] [address] [place] [address]  [phone]

## 2023-12-18 ENCOUNTER — CLINICAL SUPPORT (OUTPATIENT)
Dept: PEDIATRIC CARDIOLOGY | Facility: CLINIC | Age: 33
End: 2023-12-18
Attending: PEDIATRICS
Payer: MEDICARE

## 2023-12-18 ENCOUNTER — OFFICE VISIT (OUTPATIENT)
Dept: PEDIATRIC CARDIOLOGY | Facility: CLINIC | Age: 33
End: 2023-12-18
Payer: MEDICARE

## 2023-12-18 VITALS
BODY MASS INDEX: 31.65 KG/M2 | WEIGHT: 172 LBS | RESPIRATION RATE: 18 BRPM | SYSTOLIC BLOOD PRESSURE: 111 MMHG | DIASTOLIC BLOOD PRESSURE: 59 MMHG | HEIGHT: 62 IN | HEART RATE: 60 BPM | OXYGEN SATURATION: 98 %

## 2023-12-18 DIAGNOSIS — Z98.890 PERSONAL HISTORY OF SURGERY TO HEART AND GREAT VESSELS, PRESENTING HAZARDS TO HEALTH: ICD-10-CM

## 2023-12-18 DIAGNOSIS — Q24.9 ADULT CONGENITAL HEART DISEASE: ICD-10-CM

## 2023-12-18 DIAGNOSIS — Q21.3 TETRALOGY OF FALLOT: ICD-10-CM

## 2023-12-18 DIAGNOSIS — I35.1 NONRHEUMATIC AORTIC VALVE INSUFFICIENCY: ICD-10-CM

## 2023-12-18 DIAGNOSIS — Z87.74 TETRALOGY OF FALLOT S/P REPAIR: Primary | ICD-10-CM

## 2023-12-18 DIAGNOSIS — Z95.3 S/P PULMONARY VALVE REPLACEMENT WITH BIOPROSTHETIC VALVE: ICD-10-CM

## 2023-12-18 DIAGNOSIS — I10 ESSENTIAL HYPERTENSION: ICD-10-CM

## 2023-12-18 DIAGNOSIS — Z87.74 TETRALOGY OF FALLOT S/P REPAIR: ICD-10-CM

## 2023-12-18 PROCEDURE — 3074F SYST BP LT 130 MM HG: CPT | Mod: CPTII,S$GLB,, | Performed by: PEDIATRICS

## 2023-12-18 PROCEDURE — 3008F BODY MASS INDEX DOCD: CPT | Mod: CPTII,S$GLB,, | Performed by: PEDIATRICS

## 2023-12-18 PROCEDURE — 93242 EXT ECG>48HR<7D RECORDING: CPT | Mod: ,,, | Performed by: PEDIATRICS

## 2023-12-18 PROCEDURE — 99214 OFFICE O/P EST MOD 30 MIN: CPT | Mod: 25,S$GLB,, | Performed by: PEDIATRICS

## 2023-12-18 PROCEDURE — 93244 CV 3-14 DAY PEDIATRIC HOLTER MONITOR (CUPID ONLY): ICD-10-PCS | Mod: ,,, | Performed by: PEDIATRICS

## 2023-12-18 PROCEDURE — 4010F PR ACE/ARB THEARPY RXD/TAKEN: ICD-10-PCS | Mod: CPTII,S$GLB,, | Performed by: PEDIATRICS

## 2023-12-18 PROCEDURE — 93244 EXT ECG>48HR<7D REV&INTERPJ: CPT | Mod: ,,, | Performed by: PEDIATRICS

## 2023-12-18 PROCEDURE — 4010F ACE/ARB THERAPY RXD/TAKEN: CPT | Mod: CPTII,S$GLB,, | Performed by: PEDIATRICS

## 2023-12-18 PROCEDURE — 99214 PR OFFICE/OUTPT VISIT, EST, LEVL IV, 30-39 MIN: ICD-10-PCS | Mod: 25,S$GLB,, | Performed by: PEDIATRICS

## 2023-12-18 PROCEDURE — 1159F PR MEDICATION LIST DOCUMENTED IN MEDICAL RECORD: ICD-10-PCS | Mod: CPTII,S$GLB,, | Performed by: PEDIATRICS

## 2023-12-18 PROCEDURE — 93242 CV 3-14 DAY PEDIATRIC HOLTER MONITOR (CUPID ONLY): ICD-10-PCS | Mod: ,,, | Performed by: PEDIATRICS

## 2023-12-18 PROCEDURE — 3078F PR MOST RECENT DIASTOLIC BLOOD PRESSURE < 80 MM HG: ICD-10-PCS | Mod: CPTII,S$GLB,, | Performed by: PEDIATRICS

## 2023-12-18 PROCEDURE — 3078F DIAST BP <80 MM HG: CPT | Mod: CPTII,S$GLB,, | Performed by: PEDIATRICS

## 2023-12-18 PROCEDURE — 3008F PR BODY MASS INDEX (BMI) DOCUMENTED: ICD-10-PCS | Mod: CPTII,S$GLB,, | Performed by: PEDIATRICS

## 2023-12-18 PROCEDURE — 3074F PR MOST RECENT SYSTOLIC BLOOD PRESSURE < 130 MM HG: ICD-10-PCS | Mod: CPTII,S$GLB,, | Performed by: PEDIATRICS

## 2023-12-18 PROCEDURE — 1159F MED LIST DOCD IN RCRD: CPT | Mod: CPTII,S$GLB,, | Performed by: PEDIATRICS

## 2023-12-21 RX ORDER — SERTRALINE HYDROCHLORIDE 25 MG/1
TABLET, FILM COATED ORAL
Qty: 30 TABLET | Refills: 5 | Status: SHIPPED | OUTPATIENT
Start: 2023-12-21

## 2023-12-29 ENCOUNTER — PATIENT MESSAGE (OUTPATIENT)
Dept: PEDIATRIC CARDIOLOGY | Facility: CLINIC | Age: 33
End: 2023-12-29
Payer: MEDICARE

## 2023-12-29 LAB
OHS CV EVENT MONITOR DAY: 3
OHS CV HOLTER HOOKUP DATE: NORMAL
OHS CV HOLTER HOOKUP TIME: NORMAL
OHS CV HOLTER LENGTH DECIMAL HOURS: 77
OHS CV HOLTER LENGTH HOURS: 5
OHS CV HOLTER LENGTH MINUTES: 0
OHS CV HOLTER SCAN DATE: NORMAL
OHS CV HOLTER SINUS AVERAGE HR: 64 BPM
OHS CV HOLTER SINUS MAX HR: 92 BPM
OHS CV HOLTER SINUS MIN HR: 52 BPM
OHS CV HOLTER STUDY END DATE: NORMAL
OHS CV HOLTER STUDY END TIME: NORMAL

## 2024-01-26 RX ORDER — METOPROLOL SUCCINATE 50 MG/1
50 TABLET, EXTENDED RELEASE ORAL DAILY
Qty: 30 TABLET | Refills: 6 | Status: SHIPPED | OUTPATIENT
Start: 2024-01-26

## 2024-03-20 ENCOUNTER — TELEPHONE (OUTPATIENT)
Dept: PRIMARY CARE CLINIC | Facility: CLINIC | Age: 34
End: 2024-03-20
Payer: MEDICARE

## 2024-03-26 NOTE — PROGRESS NOTES
Colleen Hull MD   [id number] [id number]     Patient ID: [id number]     Chief Complaint: Wellness exam        HPI:     Jocelin Longo is a 34 y.o. female here today for annual Wellness. She saw Dr Catalan in December and he found her doing well. He knows she will need pulmonary valve replacement at some point but feels she would be a candidate for catheter based valve procedure. She is staying busy at work.       Subjective:     Review of Systems   Constitutional: Negative.    HENT:  Positive for congestion.         Rhinitis in the morning when she first gets up   Eyes: Negative.         Due in April   Respiratory: Negative.     Cardiovascular:  Negative for chest pain, palpitations and leg swelling.        As per HPI   Gastrointestinal: Negative.    Genitourinary: Negative.    Musculoskeletal: Negative.    Skin: Negative.    Neurological: Negative.    Endo/Heme/Allergies: Negative.    Psychiatric/Behavioral: Negative.          The medicine is doing ok, no issues       Past Medical History:   Diagnosis Date    Anticoagulant long-term use     Asthma     COVID-19 5/15/2022    Hypertension     Hypothyroid     Mild aortic insufficiency     Pulmonary insufficiency     TOF (tetralogy of Fallot)     Ventricular tachycardia         Past Surgical History:   Procedure Laterality Date    CARDIAC SURGERY      PVR    DENTAL SURGERY      EYE SURGERY      TETRALOGY OF FALLOT REPAIR      TONSILLECTOMY         Family History   Problem Relation Age of Onset    No Known Problems Mother     No Known Problems Father     No Known Problems Sister     No Known Problems Brother     Hypertension Maternal Grandmother     Hypertension Maternal Grandfather     Heart disease Maternal Grandfather     Heart disease Paternal Grandmother     No Known Problems Paternal Grandfather     No Known Problems Maternal Aunt     Stroke Maternal Uncle     Hypertension Maternal Uncle     Fainting Maternal Uncle     Coronary artery disease Maternal Uncle      Epilepsy Maternal Uncle     No Known Problems Paternal Aunt     No Known Problems Paternal Uncle     Anemia Neg Hx     Arrhythmia Neg Hx     Asthma Neg Hx     Clotting disorder Neg Hx     Heart attack Neg Hx     Heart failure Neg Hx     Hyperlipidemia Neg Hx     Atrial Septal Defect Neg Hx         Social History     Socioeconomic History    Marital status: Single   Tobacco Use    Smoking status: Never    Smokeless tobacco: Never   Substance and Sexual Activity    Alcohol use: Never     Comment: socially    Drug use: Never   Social History Narrative    Lives with parents and older brother.  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      Social Determinants of Health     Financial Resource Strain: Low Risk  (3/27/2024)    Overall Financial Resource Strain (CARDIA)     Difficulty of Paying Living Expenses: Not hard at all   Food Insecurity: No Food Insecurity (3/27/2024)    Hunger Vital Sign     Worried About Running Out of Food in the Last Year: Never true     Ran Out of Food in the Last Year: Never true   Transportation Needs: No Transportation Needs (3/27/2024)    PRAPARE - Transportation     Lack of Transportation (Medical): No     Lack of Transportation (Non-Medical): No   Physical Activity: Inactive (3/27/2024)    Exercise Vital Sign     Days of Exercise per Week: 0 days     Minutes of Exercise per Session: 0 min   Stress: No Stress Concern Present (3/27/2024)    [place] [employer] - Occupational Stress Questionnaire     Feeling of Stress : Not at all   Social Connections: Moderately Isolated (3/27/2024)    Social Connection and Isolation Panel [NHANES]     Frequency of Communication with Friends and Family: More than three times a week     Frequency of Social Gatherings with Friends and Family: More than three times a week     Attends [employer] Services: 1 to 4 times per year     Active Member of Clubs or Organizations: No     Attends Club or Organization Meetings: Never     Marital Status: Never  "   Housing Stability: Low Risk  (3/27/2024)    Housing Stability Vital Sign     Unable to Pay for Housing in the Last Year: No     Number of Places Lived in the Last Year: 1     Unstable Housing in the Last Year: No       Review of patient's allergies indicates:  No Known Allergies    Outpatient Medications Marked as Taking for the 3/27/24 encounter (Office Visit) with Colleen Hull MD   Medication Sig Dispense Refill    aspirin (ECOTRIN) 81 MG EC tablet Take 81 mg by mouth once daily.      clotrimazole-betamethasone 1-0.05% (LOTRISONE) cream Apply topically 2 (two) times daily. (Patient taking differently: Apply topically daily as needed.) 45 g 1    hydroCHLOROthiazide (HYDRODIURIL) 25 MG tablet TAKE 1 TABLET BY MOUTH ONCE DAILY 30 tablet 10    ibuprofen (ADVIL,MOTRIN) 800 MG tablet Take 800 mg by mouth every 8 (eight) hours as needed for Pain.      levothyroxine (SYNTHROID) 50 MCG tablet TAKE 1 TABLET BY MOUTH ONCE DAILY. 90 tablet 3    losartan (COZAAR) 50 MG tablet TAKE ONE TABLET BY MOUTH TWICE DAILY 60 tablet 11    metoprolol succinate (TOPROL-XL) 50 MG 24 hr tablet Take 1 tablet (50 mg total) by mouth once daily. 30 tablet 6    potassium chloride (MICRO-K) 10 MEQ CpSR TAKE 1 CAPSULE BY MOUTH ONCE DAILY 30 capsule 5    sertraline (ZOLOFT) 25 MG tablet TAKE 1 TABLET BY MOUTH ONCE DAILY 30 tablet 5       Patient Care Team:  Colleen Hull MD as PCP - General (Internal Medicine)  Colleen Hull MD King, Mika M., MD as Consulting Physician (Obstetrics and Gynecology)  Mazin Catalan MD as Consulting Physician (Pediatric Cardiology)  Teena Beyer MD as Consulting Physician (Urology)  Ko Rudolph MD as Consulting Physician (Otolaryngology)       Objective:     /71   Pulse (!) 54   Temp 97.4 °F (36.3 °C) (Oral)   Resp 16   Ht 5' 2" (1.575 m)   Wt 79.4 kg (175 lb)   LMP 02/25/2024   BMI 32.01 kg/m²     Physical Exam  Vitals reviewed.   HENT:      Head: Normocephalic " and atraumatic.      Right Ear: Tympanic membrane, ear canal and external ear normal.      Left Ear: Tympanic membrane, ear canal and external ear normal.      Mouth/Throat:      Mouth: Mucous membranes are moist.      Pharynx: Oropharynx is clear. No oropharyngeal exudate or posterior oropharyngeal erythema.   Eyes:      General: No scleral icterus.     Extraocular Movements: Extraocular movements intact.      Conjunctiva/sclera: Conjunctivae normal.      Pupils: Pupils are equal, round, and reactive to light.   Cardiovascular:      Rate and Rhythm: Regular rhythm. Bradycardia present.      Heart sounds: Murmur heard.      No gallop.   Abdominal:      General: Bowel sounds are normal.      Palpations: Abdomen is soft.      Tenderness: There is no abdominal tenderness. There is no guarding.   Musculoskeletal:         General: Normal range of motion.      Cervical back: Normal range of motion.   Lymphadenopathy:      Cervical: No cervical adenopathy.   Skin:     General: Skin is warm and dry.      Coloration: Skin is pale.   Neurological:      General: No focal deficit present.      Mental Status: She is alert.   Psychiatric:         Mood and Affect: Mood normal.         Behavior: Behavior normal.         Thought Content: Thought content normal.         Judgment: Judgment normal.      Comments: Some stuttering with talking about dating             Clinical Support on 12/18/2023   Component Date Value    Holter Hookup Date 12/21/2023 [id number]     Holter Hookup Time 12/21/2023 [id number]     Holter Study End Date 12/21/2023 [id number]     Holter Study End Time 12/21/2023 [id number]     Holter Scan Date 12/21/2023 [id number]     Sinus min HR 12/21/2023 52     Sinus max hr 12/21/2023 92     Sinus avg hr 12/21/2023 64     Event Monitor Day 12/21/2023 3     Holter length hours 12/21/2023 5     holter length minutes 12/21/2023 0     holter length dec hours 12/21/2023 77.00    Lab Visit on 11/29/2023   Component Date Value    Sodium  Level 11/29/2023 142     Potassium Level 11/29/2023 4.3     Chloride 11/29/2023 102     Carbon Dioxide 11/29/2023 30 (H)     Glucose Level 11/29/2023 80     Blood Urea Nitrogen 11/29/2023 11.4     Creatinine 11/29/2023 0.77     BUN/Creatinine Ratio 11/29/2023 15     Calcium Level Total 11/29/2023 9.6     Anion Gap 11/29/2023 10.0     eGFR 11/29/2023 >60        Assessment/Plan:     1. Wellness examination  Comments:  Encouraged to stay active and eat healthy.  Orders:  -     CBC Auto Differential  -     Comprehensive Metabolic Panel  -     Lipid Panel  -     TSH  -     Urinalysis, Reflex to Urine Culture    2. Adult congenital heart disease  Comments:  Keeping follow up with Dr Catalan as scheduled  Orders:  -     CBC Auto Differential  -     Comprehensive Metabolic Panel    3. Nonrheumatic aortic valve insufficiency  Comments:  Stable on last testing  Overview:  dilated ascending aorta      4. Essential hypertension  Comments:  Continue losartan, metoprolol doesn't need refill today  Orders:  -     Comprehensive Metabolic Panel  -     Lipid Panel  -     Urinalysis, Reflex to Urine Culture    5. Tetralogy of Fallot s/p repair    6. Peripelvic (lymphatic) cyst  Comments:  Did workup with US originally though hydronephrosis but last CT shows as peripelvic cyst    7. Acquired hypothyroidism  Comments:  TSH today  Orders:  -     TSH    8. Mixed anxiety depressive disorder  Comments:  Stable on low dose Zoloft    9. Mild intermittent asthma without complication  Comments:  No episodes since I've been following her  Orders:  -     CBC Auto Differential    10. Pyuria  Comments:  Reflex order dx  Orders:  -     Urinalysis, Reflex to Urine Culture    11. Encounter for hepatitis C screening test for low risk patient  -     Hepatitis C Antibody    12. Hyperglycemia  -     Hemoglobin A1C    13. Screening for diabetes mellitus  -     Hemoglobin A1C             Follow up in about 6 months (around 9/27/2024) for Medication Managment.  In addition to their scheduled follow up, the patient has also been instructed to follow up on as needed basis.     Signature:  Colleen Hull MD  [facility]  [id number] [id number]

## 2024-03-27 ENCOUNTER — PATIENT MESSAGE (OUTPATIENT)
Dept: PRIMARY CARE CLINIC | Facility: CLINIC | Age: 34
End: 2024-03-27

## 2024-03-27 ENCOUNTER — OFFICE VISIT (OUTPATIENT)
Dept: PRIMARY CARE CLINIC | Facility: CLINIC | Age: 34
End: 2024-03-27
Payer: MEDICARE

## 2024-03-27 VITALS
RESPIRATION RATE: 16 BRPM | HEIGHT: 62 IN | WEIGHT: 175 LBS | SYSTOLIC BLOOD PRESSURE: 107 MMHG | TEMPERATURE: 97 F | HEART RATE: 54 BPM | DIASTOLIC BLOOD PRESSURE: 71 MMHG | BODY MASS INDEX: 32.2 KG/M2

## 2024-03-27 DIAGNOSIS — F41.8 MIXED ANXIETY DEPRESSIVE DISORDER: ICD-10-CM

## 2024-03-27 DIAGNOSIS — Z13.1 SCREENING FOR DIABETES MELLITUS: ICD-10-CM

## 2024-03-27 DIAGNOSIS — I10 ESSENTIAL HYPERTENSION: ICD-10-CM

## 2024-03-27 DIAGNOSIS — Z11.59 ENCOUNTER FOR HEPATITIS C SCREENING TEST FOR LOW RISK PATIENT: ICD-10-CM

## 2024-03-27 DIAGNOSIS — Z00.00 WELLNESS EXAMINATION: Primary | ICD-10-CM

## 2024-03-27 DIAGNOSIS — R73.9 HYPERGLYCEMIA: ICD-10-CM

## 2024-03-27 DIAGNOSIS — N28.1 PERIPELVIC (LYMPHATIC) CYST: ICD-10-CM

## 2024-03-27 DIAGNOSIS — Z87.74 TETRALOGY OF FALLOT S/P REPAIR: ICD-10-CM

## 2024-03-27 DIAGNOSIS — J45.20 MILD INTERMITTENT ASTHMA WITHOUT COMPLICATION: ICD-10-CM

## 2024-03-27 DIAGNOSIS — R82.81 PYURIA: ICD-10-CM

## 2024-03-27 DIAGNOSIS — I35.1 NONRHEUMATIC AORTIC VALVE INSUFFICIENCY: ICD-10-CM

## 2024-03-27 DIAGNOSIS — E03.9 ACQUIRED HYPOTHYROIDISM: ICD-10-CM

## 2024-03-27 DIAGNOSIS — Q24.9 ADULT CONGENITAL HEART DISEASE: ICD-10-CM

## 2024-03-27 LAB
ALBUMIN SERPL-MCNC: 3.9 G/DL (ref 3.5–5)
ALBUMIN/GLOB SERPL: 1.2 RATIO (ref 1.1–2)
ALP SERPL-CCNC: 66 UNIT/L (ref 40–150)
ALT SERPL-CCNC: 15 UNIT/L (ref 0–55)
APPEARANCE UR: CLEAR
AST SERPL-CCNC: 21 UNIT/L (ref 5–34)
BACTERIA #/AREA URNS AUTO: ABNORMAL /HPF
BASOPHILS # BLD AUTO: 0.02 X10(3)/MCL
BASOPHILS NFR BLD AUTO: 0.4 %
BILIRUB SERPL-MCNC: 0.8 MG/DL
BILIRUB UR QL STRIP.AUTO: NEGATIVE
BUN SERPL-MCNC: 12.3 MG/DL (ref 7–18.7)
CALCIUM SERPL-MCNC: 9.7 MG/DL (ref 8.4–10.2)
CHLORIDE SERPL-SCNC: 102 MMOL/L (ref 98–107)
CHOLEST SERPL-MCNC: 219 MG/DL
CHOLEST/HDLC SERPL: 6 {RATIO} (ref 0–5)
CO2 SERPL-SCNC: 27 MMOL/L (ref 22–29)
COLOR UR AUTO: YELLOW
CREAT SERPL-MCNC: 0.76 MG/DL (ref 0.55–1.02)
EOSINOPHIL # BLD AUTO: 0.07 X10(3)/MCL (ref 0–0.9)
EOSINOPHIL NFR BLD AUTO: 1.5 %
ERYTHROCYTE [DISTWIDTH] IN BLOOD BY AUTOMATED COUNT: 13.1 % (ref 11.5–17)
EST. AVERAGE GLUCOSE BLD GHB EST-MCNC: 96.8 MG/DL
GFR SERPLBLD CREATININE-BSD FMLA CKD-EPI: >60 MLS/MIN/1.73/M2
GLOBULIN SER-MCNC: 3.2 GM/DL (ref 2.4–3.5)
GLUCOSE SERPL-MCNC: 84 MG/DL (ref 74–100)
GLUCOSE UR QL STRIP.AUTO: NEGATIVE
HBA1C MFR BLD: 5 %
HCT VFR BLD AUTO: 41.4 % (ref 37–47)
HDLC SERPL-MCNC: 35 MG/DL (ref 35–60)
HGB BLD-MCNC: 13.8 G/DL (ref 12–16)
IMM GRANULOCYTES # BLD AUTO: 0.01 X10(3)/MCL (ref 0–0.04)
IMM GRANULOCYTES NFR BLD AUTO: 0.2 %
KETONES UR QL STRIP.AUTO: NEGATIVE
LDLC SERPL CALC-MCNC: 152 MG/DL (ref 50–140)
LEUKOCYTE ESTERASE UR QL STRIP.AUTO: NEGATIVE
LYMPHOCYTES # BLD AUTO: 1.27 X10(3)/MCL (ref 0.6–4.6)
LYMPHOCYTES NFR BLD AUTO: 27 %
MCH RBC QN AUTO: 29.7 PG (ref 27–31)
MCHC RBC AUTO-ENTMCNC: 33.3 G/DL (ref 33–36)
MCV RBC AUTO: 89.2 FL (ref 80–94)
MONOCYTES # BLD AUTO: 0.4 X10(3)/MCL (ref 0.1–1.3)
MONOCYTES NFR BLD AUTO: 8.5 %
NEUTROPHILS # BLD AUTO: 2.93 X10(3)/MCL (ref 2.1–9.2)
NEUTROPHILS NFR BLD AUTO: 62.4 %
NITRITE UR QL STRIP.AUTO: NEGATIVE
PH UR STRIP.AUTO: 7 [PH]
PLATELET # BLD AUTO: 170 X10(3)/MCL (ref 130–400)
PMV BLD AUTO: 11.8 FL (ref 7.4–10.4)
POTASSIUM SERPL-SCNC: 4.1 MMOL/L (ref 3.5–5.1)
PROT SERPL-MCNC: 7.1 GM/DL (ref 6.4–8.3)
PROT UR QL STRIP.AUTO: NEGATIVE
RBC # BLD AUTO: 4.64 X10(6)/MCL (ref 4.2–5.4)
RBC #/AREA URNS AUTO: ABNORMAL /HPF
RBC UR QL AUTO: NEGATIVE
SODIUM SERPL-SCNC: 138 MMOL/L (ref 136–145)
SP GR UR STRIP.AUTO: 1.02 (ref 1–1.03)
SQUAMOUS #/AREA URNS AUTO: ABNORMAL /HPF
TRIGL SERPL-MCNC: 162 MG/DL (ref 37–140)
TSH SERPL-ACNC: 1.93 UIU/ML (ref 0.35–4.94)
UROBILINOGEN UR STRIP-ACNC: 0.2
VLDLC SERPL CALC-MCNC: 32 MG/DL
WBC # SPEC AUTO: 4.7 X10(3)/MCL (ref 4.5–11.5)
WBC #/AREA URNS AUTO: ABNORMAL /HPF

## 2024-03-27 PROCEDURE — 80053 COMPREHEN METABOLIC PANEL: CPT | Performed by: INTERNAL MEDICINE

## 2024-03-27 PROCEDURE — 86803 HEPATITIS C AB TEST: CPT | Performed by: INTERNAL MEDICINE

## 2024-03-27 PROCEDURE — 81003 URINALYSIS AUTO W/O SCOPE: CPT | Performed by: INTERNAL MEDICINE

## 2024-03-27 PROCEDURE — 99395 PREV VISIT EST AGE 18-39: CPT | Mod: ,,, | Performed by: INTERNAL MEDICINE

## 2024-03-27 PROCEDURE — 36415 COLL VENOUS BLD VENIPUNCTURE: CPT | Mod: ,,, | Performed by: INTERNAL MEDICINE

## 2024-03-27 PROCEDURE — 36415 COLL VENOUS BLD VENIPUNCTURE: CPT | Performed by: INTERNAL MEDICINE

## 2024-03-27 PROCEDURE — 84443 ASSAY THYROID STIM HORMONE: CPT | Performed by: INTERNAL MEDICINE

## 2024-03-27 PROCEDURE — 80061 LIPID PANEL: CPT | Performed by: INTERNAL MEDICINE

## 2024-03-27 PROCEDURE — 85025 COMPLETE CBC W/AUTO DIFF WBC: CPT | Performed by: INTERNAL MEDICINE

## 2024-03-27 PROCEDURE — 83036 HEMOGLOBIN GLYCOSYLATED A1C: CPT | Performed by: INTERNAL MEDICINE

## 2024-03-27 NOTE — PATIENT INSTRUCTIONS
Reagan Monreal,     If you are due for any health screening(s) below please notify me so we can arrange them to be ordered and scheduled. Most healthy patients at your age complete them, but you are free to accept or refuse.     If you can't do it, I'll definitely understand. If you can, I'd certainly appreciate it!    All of your core healthy metrics are met.

## 2024-03-28 LAB — HCV AB SERPL QL IA: NONREACTIVE

## 2024-04-03 ENCOUNTER — PATIENT MESSAGE (OUTPATIENT)
Dept: PRIMARY CARE CLINIC | Facility: CLINIC | Age: 34
End: 2024-04-03
Payer: MEDICARE

## 2024-04-03 DIAGNOSIS — Z79.899 ON STATIN THERAPY: ICD-10-CM

## 2024-04-03 DIAGNOSIS — E78.00 HYPERCHOLESTEREMIA: Primary | ICD-10-CM

## 2024-04-04 RX ORDER — ATORVASTATIN CALCIUM 10 MG/1
10 TABLET, FILM COATED ORAL DAILY
Qty: 30 TABLET | Refills: 3 | Status: SHIPPED | OUTPATIENT
Start: 2024-04-04

## 2024-04-14 ENCOUNTER — PATIENT MESSAGE (OUTPATIENT)
Dept: PRIMARY CARE CLINIC | Facility: CLINIC | Age: 34
End: 2024-04-14
Payer: MEDICARE

## 2024-04-14 ENCOUNTER — PATIENT MESSAGE (OUTPATIENT)
Dept: PEDIATRIC CARDIOLOGY | Facility: CLINIC | Age: 34
End: 2024-04-14
Payer: MEDICARE

## 2024-05-02 RX ORDER — POTASSIUM CHLORIDE 750 MG/1
10 CAPSULE, EXTENDED RELEASE ORAL DAILY
Qty: 30 CAPSULE | Refills: 5 | Status: SHIPPED | OUTPATIENT
Start: 2024-05-02

## 2024-05-08 ENCOUNTER — PATIENT MESSAGE (OUTPATIENT)
Dept: PRIMARY CARE CLINIC | Facility: CLINIC | Age: 34
End: 2024-05-08
Payer: MEDICARE

## 2024-05-08 ENCOUNTER — LAB VISIT (OUTPATIENT)
Dept: LAB | Facility: HOSPITAL | Age: 34
End: 2024-05-08
Attending: INTERNAL MEDICINE
Payer: MEDICARE

## 2024-05-08 DIAGNOSIS — Z79.899 ON STATIN THERAPY: ICD-10-CM

## 2024-05-08 DIAGNOSIS — E78.00 HYPERCHOLESTEREMIA: ICD-10-CM

## 2024-05-08 LAB
ALBUMIN SERPL-MCNC: 3.9 G/DL (ref 3.5–5)
ALP SERPL-CCNC: 69 UNIT/L (ref 40–150)
ALT SERPL-CCNC: 11 UNIT/L (ref 0–55)
AST SERPL-CCNC: 21 UNIT/L (ref 5–34)
BILIRUB SERPL-MCNC: 0.8 MG/DL
BILIRUBIN DIRECT+TOT PNL SERPL-MCNC: 0.3 MG/DL (ref 0–?)
BILIRUBIN DIRECT+TOT PNL SERPL-MCNC: 0.5 MG/DL (ref 0–0.8)
CK SERPL-CCNC: 79 U/L (ref 29–168)
PROT SERPL-MCNC: 7.2 GM/DL (ref 6.4–8.3)

## 2024-05-08 PROCEDURE — 80076 HEPATIC FUNCTION PANEL: CPT

## 2024-05-08 PROCEDURE — 36415 COLL VENOUS BLD VENIPUNCTURE: CPT

## 2024-05-08 PROCEDURE — 82550 ASSAY OF CK (CPK): CPT

## 2024-05-09 ENCOUNTER — TELEPHONE (OUTPATIENT)
Dept: PRIMARY CARE CLINIC | Facility: CLINIC | Age: 34
End: 2024-05-09
Payer: MEDICARE

## 2024-05-09 NOTE — TELEPHONE ENCOUNTER
----- Message from Colleen Hull MD sent at 5/8/2024  6:44 PM CDT -----  Everything looks good on the statin, any problems with it?

## 2024-06-02 RX ORDER — LEVOTHYROXINE SODIUM 50 UG/1
50 TABLET ORAL DAILY
Qty: 90 TABLET | Refills: 3 | Status: SHIPPED | OUTPATIENT
Start: 2024-06-02

## 2024-06-16 RX ORDER — SERTRALINE HYDROCHLORIDE 25 MG/1
25 TABLET, FILM COATED ORAL DAILY
Qty: 30 TABLET | Refills: 5 | Status: SHIPPED | OUTPATIENT
Start: 2024-06-16

## 2024-06-27 RX ORDER — LOSARTAN POTASSIUM 50 MG/1
50 TABLET ORAL 2 TIMES DAILY
Qty: 60 TABLET | Refills: 4 | Status: SHIPPED | OUTPATIENT
Start: 2024-06-27

## 2024-07-01 ENCOUNTER — PATIENT MESSAGE (OUTPATIENT)
Dept: CARDIOLOGY | Facility: CLINIC | Age: 34
End: 2024-07-01
Payer: MEDICARE

## 2024-07-07 ENCOUNTER — PATIENT MESSAGE (OUTPATIENT)
Dept: PRIMARY CARE CLINIC | Facility: CLINIC | Age: 34
End: 2024-07-07
Payer: MEDICARE

## 2024-07-18 ENCOUNTER — PATIENT MESSAGE (OUTPATIENT)
Dept: PRIMARY CARE CLINIC | Facility: CLINIC | Age: 34
End: 2024-07-18
Payer: MEDICARE

## 2024-07-18 RX ORDER — POTASSIUM CHLORIDE 750 MG/1
10 TABLET, EXTENDED RELEASE ORAL DAILY
Qty: 30 TABLET | Refills: 5 | Status: SHIPPED | OUTPATIENT
Start: 2024-07-18

## 2024-07-24 ENCOUNTER — PATIENT MESSAGE (OUTPATIENT)
Dept: PRIMARY CARE CLINIC | Facility: CLINIC | Age: 34
End: 2024-07-24
Payer: MEDICARE

## 2024-07-24 RX ORDER — CLOTRIMAZOLE 1 %
CREAM (GRAM) TOPICAL 2 TIMES DAILY
Qty: 45 G | Refills: 1 | Status: SHIPPED | OUTPATIENT
Start: 2024-07-24

## 2024-07-29 RX ORDER — ATORVASTATIN CALCIUM 10 MG/1
10 TABLET, FILM COATED ORAL
Qty: 30 TABLET | Refills: 3 | Status: SHIPPED | OUTPATIENT
Start: 2024-07-29

## 2024-08-27 RX ORDER — METOPROLOL SUCCINATE 50 MG/1
50 TABLET, EXTENDED RELEASE ORAL
Qty: 30 TABLET | Refills: 4 | Status: SHIPPED | OUTPATIENT
Start: 2024-08-27

## 2024-09-13 RX ORDER — HYDROCHLOROTHIAZIDE 25 MG/1
25 TABLET ORAL DAILY
Qty: 30 TABLET | Refills: 4 | Status: SHIPPED | OUTPATIENT
Start: 2024-09-13

## 2024-09-17 DIAGNOSIS — I35.1 NONRHEUMATIC AORTIC VALVE INSUFFICIENCY: ICD-10-CM

## 2024-09-17 DIAGNOSIS — Q20.9 CONGENITAL MALFORMATION OF CARDIAC CHAMBERS AND CONNECTIONS, UNSPECIFIED: ICD-10-CM

## 2024-09-17 DIAGNOSIS — Z98.890 PERSONAL HISTORY OF SURGERY TO HEART AND GREAT VESSELS, PRESENTING HAZARDS TO HEALTH: ICD-10-CM

## 2024-09-17 DIAGNOSIS — Q24.9 ADULT CONGENITAL HEART DISEASE: Primary | ICD-10-CM

## 2024-09-17 DIAGNOSIS — Z95.3 S/P PULMONARY VALVE REPLACEMENT WITH BIOPROSTHETIC VALVE: ICD-10-CM

## 2024-09-25 ENCOUNTER — TELEPHONE (OUTPATIENT)
Dept: PRIMARY CARE CLINIC | Facility: CLINIC | Age: 34
End: 2024-09-25
Payer: MEDICARE

## 2024-10-01 NOTE — PROGRESS NOTES
Colleen Hull MD   [id number] [id number]     Patient ID: [id number]     Chief Complaint: 6 Months follow for medication mangement        HPI:     Jocelin Longo is a 34 y.o. female here today for a follow up visit for her hypertension, anxiety. She has follow up in December scheduled with Dr Catalan for her Adult congenital heart disease (TOF repair). She notes she's doing ok. She has lab and studies to do there. No other complaints today.       Subjective:     Review of Systems   Constitutional: Negative.    Respiratory: Negative.     Cardiovascular: Negative.    Gastrointestinal: Negative.    Genitourinary: Negative.    Skin:  Positive for rash.        Cream has been working, she still has some   Psychiatric/Behavioral:          Mood holding       Past Medical History:   Diagnosis Date    Anticoagulant long-term use     Asthma     COVID-19 5/15/2022    Hypertension     Hypothyroid     Mild aortic insufficiency     Pulmonary insufficiency     TOF (tetralogy of Fallot)     Ventricular tachycardia         Past Surgical History:   Procedure Laterality Date    CARDIAC SURGERY      PVR    DENTAL SURGERY      EYE SURGERY      TETRALOGY OF FALLOT REPAIR      TONSILLECTOMY         Family History   Problem Relation Name Age of Onset    No Known Problems Mother      No Known Problems Father      No Known Problems Sister      No Known Problems Brother      Hypertension Maternal Grandmother      Hypertension Maternal Grandfather      Heart disease Maternal Grandfather      Heart disease Paternal Grandmother      No Known Problems Paternal Grandfather      No Known Problems Maternal Aunt      Stroke Maternal Uncle      Hypertension Maternal Uncle      Fainting Maternal Uncle      Coronary artery disease Maternal Uncle      Epilepsy Maternal Uncle      No Known Problems Paternal Aunt      No Known Problems Paternal Uncle      Anemia Neg Hx      Arrhythmia Neg Hx      Asthma Neg Hx      Clotting disorder Neg Hx      Heart  attack Neg Hx      Heart failure Neg Hx      Hyperlipidemia Neg Hx      Atrial Septal Defect Neg Hx          Social History     Socioeconomic History    Marital status: Single   Tobacco Use    Smoking status: Never    Smokeless tobacco: Never   Substance and Sexual Activity    Alcohol use: Never     Comment: socially    Drug use: Never   Social History Narrative    Lives with parents and older brother.  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      Social Drivers of Health     Financial Resource Strain: Low Risk  (3/27/2024)    Overall Financial Resource Strain (CARDIA)     Difficulty of Paying Living Expenses: Not hard at all   Food Insecurity: No Food Insecurity (3/27/2024)    Hunger Vital Sign     Worried About Running Out of Food in the Last Year: Never true     Ran Out of Food in the Last Year: Never true   Transportation Needs: No Transportation Needs (3/27/2024)    PRAPARE - Transportation     Lack of Transportation (Medical): No     Lack of Transportation (Non-Medical): No   Physical Activity: Inactive (3/27/2024)    Exercise Vital Sign     Days of Exercise per Week: 0 days     Minutes of Exercise per Session: 0 min   Stress: No Stress Concern Present (3/27/2024)    [place] [employer] - Occupational Stress Questionnaire     Feeling of Stress : Not at all   Housing Stability: Low Risk  (3/27/2024)    Housing Stability Vital Sign     Unable to Pay for Housing in the Last Year: No     Number of Places Lived in the Last Year: 1     Unstable Housing in the Last Year: No       Review of patient's allergies indicates:  No Known Allergies    Outpatient Medications Marked as Taking for the 10/2/24 encounter (Office Visit) with Colleen Hull MD   Medication Sig Dispense Refill    aspirin (ECOTRIN) 81 MG EC tablet Take 81 mg by mouth once daily.      atorvastatin (LIPITOR) 10 MG tablet TAKE 1 TABLET BY MOUTH DAILY 30 tablet 3    clotrimazole (LOTRIMIN) 1 % cream Apply topically 2 (two) times daily. 45  "g 1    clotrimazole-betamethasone 1-0.05% (LOTRISONE) cream Apply topically 2 (two) times daily. 45 g 1    hydroCHLOROthiazide (HYDRODIURIL) 25 MG tablet Take 1 tablet (25 mg total) by mouth once daily. 30 tablet 4    ibuprofen (ADVIL,MOTRIN) 800 MG tablet Take 800 mg by mouth every 8 (eight) hours as needed for Pain.      levothyroxine (SYNTHROID) 50 MCG tablet Take 1 tablet (50 mcg total) by mouth once daily. 90 tablet 3    losartan (COZAAR) 50 MG tablet Take 1 tablet (50 mg total) by mouth 2 (two) times daily. 60 tablet 4    metoprolol succinate (TOPROL-XL) 50 MG 24 hr tablet TAKE 1 TABLET BY MOUTH EVERY DAY 30 tablet 4    potassium chloride (KLOR-CON) 10 MEQ TbSR Take 1 tablet (10 mEq total) by mouth once daily. 30 tablet 5    sertraline (ZOLOFT) 25 MG tablet Take 1 tablet (25 mg total) by mouth once daily. 30 tablet 5       Patient Care Team:  Colleen Hull MD as PCP - General (Internal Medicine)  Colleen Hull MD King, Mika M., MD as Consulting Physician (Obstetrics and Gynecology)  Mazin Catalan MD as Consulting Physician (Pediatric Cardiology)  Teena Beyer MD as Consulting Physician (Urology)  Ko Rudolph MD as Consulting Physician (Otolaryngology)       Objective:     /77   Pulse (!) 54   Temp 97.6 °F (36.4 °C) (Oral)   Resp 15   Ht 5' 2" (1.575 m)   Wt 79.4 kg (175 lb)   LMP 08/28/2024   SpO2 97%   BMI 32.01 kg/m²     Physical Exam  Vitals reviewed.   Constitutional:       Appearance: She is obese. She is not ill-appearing.   Cardiovascular:      Rate and Rhythm: Regular rhythm. Bradycardia present.      Heart sounds:      No gallop.   Pulmonary:      Effort: Pulmonary effort is normal.      Breath sounds: No wheezing or rales.   Abdominal:      Palpations: Abdomen is soft.      Tenderness: There is no abdominal tenderness. There is no guarding.   Skin:     General: Skin is warm and dry.      Findings: No rash.   Neurological:      Mental Status: She is " alert.   Psychiatric:         Mood and Affect: Mood normal.      Comments: Stuttering more today               Assessment/Plan:     1. Essential hypertension    2. Mixed anxiety depressive disorder    3. Adult congenital heart disease             No follow-ups on file. In addition to their scheduled follow up, the patient has also been instructed to follow up on as needed basis.     Signature:  Colleen Hull MD  [facility]  [id number] [id number]

## 2024-10-02 ENCOUNTER — OFFICE VISIT (OUTPATIENT)
Dept: PRIMARY CARE CLINIC | Facility: CLINIC | Age: 34
End: 2024-10-02
Payer: MEDICARE

## 2024-10-02 VITALS
TEMPERATURE: 98 F | SYSTOLIC BLOOD PRESSURE: 117 MMHG | OXYGEN SATURATION: 97 % | WEIGHT: 175 LBS | DIASTOLIC BLOOD PRESSURE: 77 MMHG | RESPIRATION RATE: 15 BRPM | HEIGHT: 62 IN | HEART RATE: 54 BPM | BODY MASS INDEX: 32.2 KG/M2

## 2024-10-02 DIAGNOSIS — I10 ESSENTIAL HYPERTENSION: Primary | ICD-10-CM

## 2024-10-02 DIAGNOSIS — N92.6 IRREGULAR PERIODS: ICD-10-CM

## 2024-10-02 DIAGNOSIS — F41.8 MIXED ANXIETY DEPRESSIVE DISORDER: ICD-10-CM

## 2024-10-02 DIAGNOSIS — Z83.49 FAMILY HISTORY OF THYROID DISEASE: ICD-10-CM

## 2024-10-02 DIAGNOSIS — Q24.9 ADULT CONGENITAL HEART DISEASE: ICD-10-CM

## 2024-10-02 PROCEDURE — 3008F BODY MASS INDEX DOCD: CPT | Mod: CPTII,,, | Performed by: INTERNAL MEDICINE

## 2024-10-02 PROCEDURE — 99214 OFFICE O/P EST MOD 30 MIN: CPT | Mod: ,,, | Performed by: INTERNAL MEDICINE

## 2024-10-02 PROCEDURE — 3078F DIAST BP <80 MM HG: CPT | Mod: CPTII,,, | Performed by: INTERNAL MEDICINE

## 2024-10-02 PROCEDURE — 1159F MED LIST DOCD IN RCRD: CPT | Mod: CPTII,,, | Performed by: INTERNAL MEDICINE

## 2024-10-02 PROCEDURE — 36415 COLL VENOUS BLD VENIPUNCTURE: CPT | Mod: ,,, | Performed by: INTERNAL MEDICINE

## 2024-10-02 PROCEDURE — 1160F RVW MEDS BY RX/DR IN RCRD: CPT | Mod: CPTII,,, | Performed by: INTERNAL MEDICINE

## 2024-10-02 PROCEDURE — 3044F HG A1C LEVEL LT 7.0%: CPT | Mod: CPTII,,, | Performed by: INTERNAL MEDICINE

## 2024-10-02 PROCEDURE — 4010F ACE/ARB THERAPY RXD/TAKEN: CPT | Mod: CPTII,,, | Performed by: INTERNAL MEDICINE

## 2024-10-02 PROCEDURE — 3074F SYST BP LT 130 MM HG: CPT | Mod: CPTII,,, | Performed by: INTERNAL MEDICINE

## 2024-10-02 NOTE — PROGRESS NOTES
Colleen Hull MD   [id number] [id number]     Patient ID: [id number]     Chief Complaint: 6 Months follow for medication mangement        HPI:     Jocelin Longo is a 34 y.o. female here today for a follow up visit for her hypertension, anxiety. She has follow up in December scheduled with Dr Catalan for her Adult congenital heart disease (TOF repair). She notes she's doing ok. She has lab and studies to do there. No other complaints today.       Subjective:     Review of Systems   Constitutional: Negative.    Respiratory: Negative.     Cardiovascular: Negative.    Gastrointestinal: Negative.    Genitourinary: Negative.    Skin:  Positive for rash.        Cream has been working, she still has some   Psychiatric/Behavioral:          Mood holding       Past Medical History:   Diagnosis Date    Anticoagulant long-term use     Asthma     COVID-19 5/15/2022    Hypertension     Hypothyroid     Mild aortic insufficiency     Pulmonary insufficiency     TOF (tetralogy of Fallot)     Ventricular tachycardia         Past Surgical History:   Procedure Laterality Date    CARDIAC SURGERY      PVR    DENTAL SURGERY      EYE SURGERY      TETRALOGY OF FALLOT REPAIR      TONSILLECTOMY         Family History   Problem Relation Name Age of Onset    No Known Problems Mother      No Known Problems Father      No Known Problems Sister      No Known Problems Brother      Hypertension Maternal Grandmother      Hypertension Maternal Grandfather      Heart disease Maternal Grandfather      Heart disease Paternal Grandmother      No Known Problems Paternal Grandfather      No Known Problems Maternal Aunt      Stroke Maternal Uncle      Hypertension Maternal Uncle      Fainting Maternal Uncle      Coronary artery disease Maternal Uncle      Epilepsy Maternal Uncle      No Known Problems Paternal Aunt      No Known Problems Paternal Uncle      Anemia Neg Hx      Arrhythmia Neg Hx      Asthma Neg Hx      Clotting disorder Neg Hx      Heart  attack Neg Hx      Heart failure Neg Hx      Hyperlipidemia Neg Hx      Atrial Septal Defect Neg Hx          Social History     Socioeconomic History    Marital status: Single   Tobacco Use    Smoking status: Never    Smokeless tobacco: Never   Substance and Sexual Activity    Alcohol use: Never     Comment: socially    Drug use: Never   Social History Narrative    Lives with parents and older brother.  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      Social Drivers of Health     Financial Resource Strain: Low Risk  (3/27/2024)    Overall Financial Resource Strain (CARDIA)     Difficulty of Paying Living Expenses: Not hard at all   Food Insecurity: No Food Insecurity (3/27/2024)    Hunger Vital Sign     Worried About Running Out of Food in the Last Year: Never true     Ran Out of Food in the Last Year: Never true   Transportation Needs: No Transportation Needs (3/27/2024)    PRAPARE - Transportation     Lack of Transportation (Medical): No     Lack of Transportation (Non-Medical): No   Physical Activity: Inactive (3/27/2024)    Exercise Vital Sign     Days of Exercise per Week: 0 days     Minutes of Exercise per Session: 0 min   Stress: No Stress Concern Present (3/27/2024)    [employer] [place] [employer] - Occupational Stress Questionnaire     Feeling of Stress : Not at all   Housing Stability: Low Risk  (3/27/2024)    Housing Stability Vital Sign     Unable to Pay for Housing in the Last Year: No     Number of Places Lived in the Last Year: 1     Unstable Housing in the Last Year: No       Review of patient's allergies indicates:  No Known Allergies    Outpatient Medications Marked as Taking for the 10/2/24 encounter (Office Visit) with Colleen Hull MD   Medication Sig Dispense Refill    aspirin (ECOTRIN) 81 MG EC tablet Take 81 mg by mouth once daily.      atorvastatin (LIPITOR) 10 MG tablet TAKE 1 TABLET BY MOUTH DAILY 30 tablet 3    clotrimazole (LOTRIMIN) 1 % cream Apply topically 2 (two) times daily. 45  "g 1    clotrimazole-betamethasone 1-0.05% (LOTRISONE) cream Apply topically 2 (two) times daily. 45 g 1    hydroCHLOROthiazide (HYDRODIURIL) 25 MG tablet Take 1 tablet (25 mg total) by mouth once daily. 30 tablet 4    ibuprofen (ADVIL,MOTRIN) 800 MG tablet Take 800 mg by mouth every 8 (eight) hours as needed for Pain.      levothyroxine (SYNTHROID) 50 MCG tablet Take 1 tablet (50 mcg total) by mouth once daily. 90 tablet 3    losartan (COZAAR) 50 MG tablet Take 1 tablet (50 mg total) by mouth 2 (two) times daily. 60 tablet 4    metoprolol succinate (TOPROL-XL) 50 MG 24 hr tablet TAKE 1 TABLET BY MOUTH EVERY DAY 30 tablet 4    potassium chloride (KLOR-CON) 10 MEQ TbSR Take 1 tablet (10 mEq total) by mouth once daily. 30 tablet 5    sertraline (ZOLOFT) 25 MG tablet Take 1 tablet (25 mg total) by mouth once daily. 30 tablet 5       Patient Care Team:  Colleen Hull MD as PCP - General (Internal Medicine)  Colleen Hull MD King, Mika M., MD as Consulting Physician (Obstetrics and Gynecology)  Mazin Catalan MD as Consulting Physician (Pediatric Cardiology)  Teena Beyer MD as Consulting Physician (Urology)  Ko Rudolph MD as Consulting Physician (Otolaryngology)       Objective:     /77   Pulse (!) 54   Temp 97.6 °F (36.4 °C) (Oral)   Resp 15   Ht 5' 2" (1.575 m)   Wt 79.4 kg (175 lb)   LMP 08/28/2024   SpO2 97%   BMI 32.01 kg/m²     Physical Exam  Vitals reviewed.   Constitutional:       Appearance: She is obese. She is not ill-appearing.   Cardiovascular:      Rate and Rhythm: Regular rhythm. Bradycardia present.      Heart sounds:      No gallop.   Pulmonary:      Effort: Pulmonary effort is normal.      Breath sounds: No wheezing or rales.   Abdominal:      Palpations: Abdomen is soft.      Tenderness: There is no abdominal tenderness. There is no guarding.   Skin:     General: Skin is warm and dry.      Findings: No rash.   Neurological:      Mental Status: She is " alert.   Psychiatric:         Mood and Affect: Mood normal.      Comments: Stuttering more today               Assessment/Plan:     1. Essential hypertension  Comments:  Good control, continue losartan 50, HCT 25  Orders:  -     Basic Metabolic Panel    2. Mixed anxiety depressive disorder  Comments:  Stable has refill on Zoloft    3. Adult congenital heart disease  Comments:  Scheduled with Dr Catalan with [facility]    4. Irregular periods  Comments:  Increased but worried it will interfere with cruise, will recheck thyroid as it runs in her family  Orders:  -     TSH    5. Family history of thyroid disease  -     TSH             Follow up in about 6 months (around 4/2/2025) for Wellness. In addition to their scheduled follow up, the patient has also been instructed to follow up on as needed basis.     Signature:  Colleen Hull MD  [facility]  [id number] [id number]

## 2024-10-03 LAB
BUN SERPL-MCNC: 11 MG/DL (ref 6–20)
BUN/CREAT SERPL: 16 (ref 9–23)
CALCIUM SERPL-MCNC: 9.2 MG/DL (ref 8.7–10.2)
CHLORIDE SERPL-SCNC: 103 MMOL/L (ref 96–106)
CO2 SERPL-SCNC: 22 MMOL/L (ref 20–29)
CREAT SERPL-MCNC: 0.68 MG/DL (ref 0.57–1)
EST. GFR  (NO RACE VARIABLE): 116 ML/MIN/1.73
GLUCOSE SERPL-MCNC: 87 MG/DL (ref 70–99)
POTASSIUM SERPL-SCNC: 3.7 MMOL/L (ref 3.5–5.2)
SODIUM SERPL-SCNC: 142 MMOL/L (ref 134–144)
TSH SERPL DL<=0.005 MIU/L-ACNC: 1.74 UIU/ML (ref 0.45–4.5)

## 2024-11-25 ENCOUNTER — PATIENT MESSAGE (OUTPATIENT)
Dept: PEDIATRIC CARDIOLOGY | Facility: CLINIC | Age: 34
End: 2024-11-25
Payer: MEDICARE

## 2024-11-25 RX ORDER — ATORVASTATIN CALCIUM 10 MG/1
10 TABLET, FILM COATED ORAL DAILY
Qty: 30 TABLET | Refills: 3 | Status: SHIPPED | OUTPATIENT
Start: 2024-11-25

## 2024-11-25 RX ORDER — LOSARTAN POTASSIUM 50 MG/1
50 TABLET ORAL 2 TIMES DAILY
Qty: 60 TABLET | Refills: 4 | Status: SHIPPED | OUTPATIENT
Start: 2024-11-25

## 2024-11-25 RX ORDER — ATORVASTATIN CALCIUM 10 MG/1
10 TABLET, FILM COATED ORAL
Qty: 30 TABLET | Refills: 3 | OUTPATIENT
Start: 2024-11-25

## 2024-12-11 RX ORDER — SERTRALINE HYDROCHLORIDE 25 MG/1
25 TABLET, FILM COATED ORAL
Qty: 30 TABLET | Refills: 11 | Status: SHIPPED | OUTPATIENT
Start: 2024-12-11

## 2024-12-12 ENCOUNTER — PATIENT MESSAGE (OUTPATIENT)
Dept: PRIMARY CARE CLINIC | Facility: CLINIC | Age: 34
End: 2024-12-12
Payer: MEDICARE

## 2024-12-12 ENCOUNTER — PATIENT MESSAGE (OUTPATIENT)
Dept: PEDIATRIC CARDIOLOGY | Facility: CLINIC | Age: 34
End: 2024-12-12
Payer: MEDICARE

## 2024-12-16 ENCOUNTER — CLINICAL SUPPORT (OUTPATIENT)
Dept: PEDIATRIC CARDIOLOGY | Facility: CLINIC | Age: 34
End: 2024-12-16
Payer: MEDICARE

## 2024-12-16 ENCOUNTER — CLINICAL SUPPORT (OUTPATIENT)
Dept: PEDIATRIC CARDIOLOGY | Facility: CLINIC | Age: 34
End: 2024-12-16
Attending: PEDIATRICS
Payer: MEDICARE

## 2024-12-16 ENCOUNTER — OFFICE VISIT (OUTPATIENT)
Dept: PEDIATRIC CARDIOLOGY | Facility: CLINIC | Age: 34
End: 2024-12-16
Payer: MEDICARE

## 2024-12-16 VITALS
OXYGEN SATURATION: 97 % | HEART RATE: 56 BPM | HEIGHT: 62 IN | RESPIRATION RATE: 16 BRPM | SYSTOLIC BLOOD PRESSURE: 137 MMHG | WEIGHT: 175.69 LBS | BODY MASS INDEX: 32.33 KG/M2 | DIASTOLIC BLOOD PRESSURE: 9 MMHG

## 2024-12-16 DIAGNOSIS — Q24.9 ADULT CONGENITAL HEART DISEASE: ICD-10-CM

## 2024-12-16 DIAGNOSIS — Z98.890 PERSONAL HISTORY OF SURGERY TO HEART AND GREAT VESSELS, PRESENTING HAZARDS TO HEALTH: ICD-10-CM

## 2024-12-16 DIAGNOSIS — Z87.74 TETRALOGY OF FALLOT S/P REPAIR: ICD-10-CM

## 2024-12-16 DIAGNOSIS — Z95.3 S/P PULMONARY VALVE REPLACEMENT WITH BIOPROSTHETIC VALVE: ICD-10-CM

## 2024-12-16 DIAGNOSIS — I35.1 NONRHEUMATIC AORTIC VALVE INSUFFICIENCY: ICD-10-CM

## 2024-12-16 DIAGNOSIS — Q20.9 CONGENITAL MALFORMATION OF CARDIAC CHAMBERS AND CONNECTIONS, UNSPECIFIED: ICD-10-CM

## 2024-12-16 DIAGNOSIS — Q24.9 CONGENITAL MALFORMATION OF HEART, UNSPECIFIED: Primary | ICD-10-CM

## 2024-12-16 PROCEDURE — 93000 ELECTROCARDIOGRAM COMPLETE: CPT | Mod: S$GLB,,, | Performed by: PEDIATRICS

## 2024-12-16 PROCEDURE — 99214 OFFICE O/P EST MOD 30 MIN: CPT | Mod: 25,S$GLB,, | Performed by: PEDIATRICS

## 2024-12-16 PROCEDURE — 3075F SYST BP GE 130 - 139MM HG: CPT | Mod: CPTII,S$GLB,, | Performed by: PEDIATRICS

## 2024-12-16 PROCEDURE — 3008F BODY MASS INDEX DOCD: CPT | Mod: CPTII,S$GLB,, | Performed by: PEDIATRICS

## 2024-12-16 PROCEDURE — 4010F ACE/ARB THERAPY RXD/TAKEN: CPT | Mod: CPTII,S$GLB,, | Performed by: PEDIATRICS

## 2024-12-16 PROCEDURE — 3044F HG A1C LEVEL LT 7.0%: CPT | Mod: CPTII,S$GLB,, | Performed by: PEDIATRICS

## 2024-12-16 PROCEDURE — 3078F DIAST BP <80 MM HG: CPT | Mod: CPTII,S$GLB,, | Performed by: PEDIATRICS

## 2024-12-16 RX ORDER — METOPROLOL SUCCINATE 100 MG/1
100 TABLET, EXTENDED RELEASE ORAL DAILY
Qty: 30 TABLET | Refills: 6 | Status: SHIPPED | OUTPATIENT
Start: 2024-12-16

## 2024-12-16 NOTE — PROGRESS NOTES
12/16/2024     RE:ALAN LONGO  Clinic# [id number]    Colleen Hull MD   [address] [facility] [id number] [facility]    Dear Dr. Hull:    I had the pleasure of seeing Alan in our adult congenital cardiology clinic in [place]. Alan Longo is a 34 y.o. female with repaired tetralogy of Fallot.  To summarize, her diagnoses are as follows:   Diagnoses:  1. Tetralogy of Fallot status post repair.  Normal biventricular function with minimal RV enlargement.  2. 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 Palpitations with a history of ventricular arrhythmia on Holter monitor and a negative EP study.    4. Mild aortic insufficiency and mild aortic root enlargement  5. Choking on food - chronic issue and not worsened.  No vocal cord dysfunction noted on ENT evaluation, but she does have a mild glottic gap.  6. Consider 22q11 deletion.  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 Significant hypertension.  Not due to her congenital heart disease.    9. Very difficult echo imaging    Discussion:  1. Good dental hygiene.  She definitely needs antibiotic prophylaxis before dental work.  2. continue current medications including aspirin, losartan, metoprolol.  Bump metoprolol up to 100mg daily.  3. follow up in 1 year with echo, ekg, and MRI in [place]  4. Healthy, low salt diet.  Regular low intensity exercise.  5. Contact me with any issues.  Any neuro changes, shortness of breath, edema, etc... should prompt immediate evaluation.  6. Holter monitor today  7. Follow up with PCP  8. They will message me next week regarding her blood pressures.  They will let me know if she is having any significant side effects from the increased dose of  metoprolol.    Discussion:  From a cardiac standpoint, she looks great.  She will need her pulmonary valve replaced in the future, but she should be a good candidate for a catheter based valve.  Her echo imaging is very difficult, but I do not hear a murmur to suggest pulmonary stenosis, her right ventricle does not look significantly enlarged, and she has good ventricular function of the right ventricle.  We will get a cardiac MRI next year.  We discussed increasing her exercise level and improving her diet.  I went up on her metoprolol.  She is going to let me know if she feels more fatigued on the higher dose of metoprolol.  She does run bradycardic, and the beta-blocker potentially could make her feel worse.  If so, we will stop that medication and consider the addition of Aldactone or another medication.    Interval history:    Her only complaint is episodes of are episodes of increased blood pressure.  A typical episode occurred last week.  She was at work, when she started to feel flushed.  She had a headache in the back of her head.  She checks her blood pressure, and it was quite elevated.  She went home.  She ultimately took an extra dose of her hydrochlorothiazide, and her blood pressure returned to normal.  She has been checking her blood pressure regularly since then.  Systolic ranging from 126-156 with diastolics ranging from 66 to 85.  Her pulse does tend to run in the 50s.  Otherwise, she has done well.  No chest pain.  No palpitations.  No edema.  No syncope or near-syncope.  Unchanged exercise tolerance.  She admits to being very sedentary.  She had her parents are motivated to start exercising.    10/8/18.  She had a renal US and abdominal CTA that showed no evidence of kidney disease or renal artery stenosis, and her creatinine and thyroid studies were normal.      She has problems with both feet.  She has hypothyroidism.  On 12/17/14, she underwent the following surgical procedure:  Redo median  sternotomy, resection of right ventricular muscle bundles, pulmonary valve replacement with a 23 mm Trifecta valve and augmentation of the main pulmonary artery.  The surgery was complicated by adherence of the heart to the sternum, making dissection difficult.  They accessed the RFA for bypass, but were not able to get in the vein (chronic occlusion).  She ultimately did quite well, however, and was discharged home after 4 days.      Years ago, I had her seen in ENT clinic.  The conclusions from that visit were as follows:  In summary, this patient has normal vocal cord motion bilaterally, ruling out any functional impairment from a paralyzed vocal cord given her remote history of TOF surgery. She does have a small glottic gap and this may contribute to her hoarseness and intermittent throat clearing with PO liquid. She tolerated PO liquid in the exam room without incident. It is likely that she is experiencing intermittent bouts of laryngeal penetration, not aspiration given her clinical picture. Discussed the only way to know for sure is with a MBSS. However, given the rarity of these episodes(Once every 1-2 weeks), it is not likely that we will catch any abnormality on that test. I have discussed a course of observation, as there is no immediate concern for aspiration. Should her symptoms of throat clearing with PO liquid intake increase in nature, we can then proceed with a MBSS to further characterize the nature of her dysphagia.     Several years ago, Jocelin was admitted to the hospital for evaluation after Holter demonstrated runs of ventricular tachycardia. During this admission, she had electrophysiology study which demonstrated no inducible ventricular arrhythmia, and she was started on a beta blocker.     The review of systems is as noted above. It is otherwise negative for other symptoms related to the general, neurological, psychiatric, endocrine, gastrointestinal, genitourinary, respiratory,  dermatologic, musculoskeletal, hematologic, and immunologic systems.     Past Medical History:   Diagnosis Date    Anticoagulant long-term use     Asthma     COVID-19 5/15/2022    Hypertension     Hypothyroid     Mild aortic insufficiency     Pulmonary insufficiency     TOF (tetralogy of Fallot)     Ventricular tachycardia      Past Surgical History:   Procedure Laterality Date    CARDIAC SURGERY      PVR    DENTAL SURGERY      EYE SURGERY      TETRALOGY OF FALLOT REPAIR      TONSILLECTOMY       Family History   Problem Relation Name Age of Onset    No Known Problems Mother      No Known Problems Father      No Known Problems Sister      No Known Problems Brother      Hypertension Maternal Grandmother      Hypertension Maternal Grandfather      Heart disease Maternal Grandfather      Heart disease Paternal Grandmother      No Known Problems Paternal Grandfather      No Known Problems Maternal Aunt      Stroke Maternal Uncle      Hypertension Maternal Uncle      Fainting Maternal Uncle      Coronary artery disease Maternal Uncle      Epilepsy Maternal Uncle      No Known Problems Paternal Aunt      No Known Problems Paternal Uncle      Anemia Neg Hx      Arrhythmia Neg Hx      Asthma Neg Hx      Clotting disorder Neg Hx      Heart attack Neg Hx      Heart failure Neg Hx      Hyperlipidemia Neg Hx      Atrial Septal Defect Neg Hx       Social History     Socioeconomic History    Marital status: Single   Tobacco Use    Smoking status: Never    Smokeless tobacco: Never   Substance and Sexual Activity    Alcohol use: Never     Comment: socially    Drug use: Never   Social History Narrative    Lives with parents and older brother.  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      Social Drivers of Health     Financial Resource Strain: Low Risk  (3/27/2024)    Overall Financial Resource Strain (CARDIA)     Difficulty of Paying Living Expenses: Not hard at all   Food Insecurity: No Food Insecurity (3/27/2024)    Hunger Vital Sign      Worried About Running Out of Food in the Last Year: Never true     Ran Out of Food in the Last Year: Never true   Transportation Needs: No Transportation Needs (3/27/2024)    PRAPARE - Transportation     Lack of Transportation (Medical): No     Lack of Transportation (Non-Medical): No   Physical Activity: Inactive (3/27/2024)    Exercise Vital Sign     Days of Exercise per Week: 0 days     Minutes of Exercise per Session: 0 min   Stress: No Stress Concern Present (3/27/2024)    [employer] - Occupational Stress Questionnaire     Feeling of Stress : Not at all   Housing Stability: Low Risk  (3/27/2024)    Housing Stability Vital Sign     Unable to Pay for Housing in the Last Year: No     Number of Places Lived in the Last Year: 1     Unstable Housing in the Last Year: No     Current Outpatient Medications on File Prior to Visit   Medication Sig Dispense Refill    aspirin (ECOTRIN) 81 MG EC tablet Take 81 mg by mouth once daily.      atorvastatin (LIPITOR) 10 MG tablet Take 1 tablet (10 mg total) by mouth once daily. 30 tablet 3    clotrimazole (LOTRIMIN) 1 % cream Apply topically 2 (two) times daily. 45 g 1    clotrimazole-betamethasone 1-0.05% (LOTRISONE) cream Apply topically 2 (two) times daily. 45 g 1    hydroCHLOROthiazide (HYDRODIURIL) 25 MG tablet Take 1 tablet (25 mg total) by mouth once daily. 30 tablet 4    ibuprofen (ADVIL,MOTRIN) 800 MG tablet Take 800 mg by mouth every 8 (eight) hours as needed for Pain.      levothyroxine (SYNTHROID) 50 MCG tablet Take 1 tablet (50 mcg total) by mouth once daily. 90 tablet 3    losartan (COZAAR) 50 MG tablet Take 1 tablet (50 mg total) by mouth 2 (two) times daily. 60 tablet 4    metoprolol succinate (TOPROL-XL) 50 MG 24 hr tablet TAKE 1 TABLET BY MOUTH EVERY DAY 30 tablet 4    potassium chloride (KLOR-CON) 10 MEQ TbSR Take 1 tablet (10 mEq total) by mouth once daily. 30 tablet 5    sertraline (ZOLOFT) 25 MG tablet TAKE 1 TABLET by mouth  "DAILY 30 tablet 11     No current facility-administered medications on file prior to visit.     Review of patient's allergies indicates:  No Known Allergies     Vitals:    12/16/24 0810   BP: (!) 137/9   BP Location: Right arm   Pulse: (!) 56   Resp: 16   SpO2: 97%   Weight: 79.7 kg (175 lb 11.2 oz)   Height: 5' 2.01" (1.575 m)       GENERAL: Jocelin is a well-developed well-nourished female. She moves symmetrically. She does have a stutter.   HEENT: No dysmorphic features are noted. Visual tracking is normal with symmetric grimace. Teeth are in good repair. No lymphadenopathy is noted.    CHEST: There is a well healed median sternotomy scar and a well healed left thoracotomy scar.  CARDIAC: The precordium is quiet. S1 is single with a split S2. There is a grade 1/6 systolic ejection murmur and a 1-2/6 soft, low pitched diastolic murmur at the RUSB.  ABDOMEN: The abdomen is soft with no hepatosplenomegaly.  EXTREMITIES: Extremities are warm and well perfused. Pulses are good in all extremities with no edema clubbing or cyanosis noted.    I personally reviewed the following tests performed today and my interpretation follows:  An echocardiogram was performed today:   1. Very difficult study.  Pulmonary valve not visualized.  No right pulmonary artery abnormality, but left pulmonary artery not well visualized.  2. Likely moderate dilation of the aortic root, I measure his biggest 4.3 cm.  However, imaging is difficult.    3. Good biventricular function.  Left ventricular function estimated around 55%.  4. No obvious aortic valve insufficiency or pulmonary valve insufficiency.      An EKG performed in clinic today reveals sinus rhythm with a rate of around 58.  A right bundle branch block is present with QRS 180ms.  There is no ectopy.  EKG is unchanged.    MRI 11/10/22:  Top normal size of the right atrium.  Tricuspid insufficiency noted.   Mildly increased right ventricular volumes. (RVEDV 117 cc/m2 for BSA, RVESV 62 " cc/m2 for BSA).  Slight increased in RV volumes for BSA in the setting of small decreased in BSA when compared to MRI 2020. RVEF 47 %.  RVOT somewhat obscured by artifact from the sternum.  Well-seated bioprosthetic pulmonary valve. Pulmonary insufficiency with regurgitant fraction of 21% and regurgitation volume of 13 cc. Peak velocity 2m/sec (no significant change).   Normal size of main and branch pulmonary arteries.   Normal left ventricular volume with LVEF 58 %.  Aortic insufficiency, regurgitation fraction of 20%, regurgitant volume of 14 cc (increased when compared to prior MRIs).   Top normal size of the aortic root with mildly dilated ascending aorta. No change in size of the ascending aorta when compared to MRIs.    Lab Results   Component Value Date    WBC 4.70 03/27/2024    HGB 13.8 03/27/2024    HCT 41.4 03/27/2024    MCV 89.2 03/27/2024     03/27/2024       CMP  Sodium   Date Value Ref Range Status   10/02/2024 142 134 - 144 mmol/L Final     Potassium   Date Value Ref Range Status   10/02/2024 3.7 3.5 - 5.2 mmol/L Final     Chloride   Date Value Ref Range Status   10/02/2024 103 96 - 106 mmol/L Final     CO2   Date Value Ref Range Status   10/02/2024 22 20 - 29 mmol/L Final     Glucose   Date Value Ref Range Status   10/02/2024 87 70 - 99 mg/dL Final     BUN   Date Value Ref Range Status   10/02/2024 11 6 - 20 mg/dL Final     Creatinine   Date Value Ref Range Status   10/02/2024 0.68 0.57 - 1.00 mg/dL Final     Calcium   Date Value Ref Range Status   10/02/2024 9.2 8.7 - 10.2 mg/dL Final     Total Protein   Date Value Ref Range Status   12/30/2014 6.4 6.0 - 8.4 g/dL Final     Albumin   Date Value Ref Range Status   05/08/2024 3.9 3.5 - 5.0 g/dL Final   12/30/2014 3.1 (L) 3.5 - 5.2 g/dL Final     Total Bilirubin   Date Value Ref Range Status   12/30/2014 0.9 0.1 - 1.0 mg/dL Final     Comment:     For infants and newborns, interpretation of results should be based  on gestational age, weight and  in agreement with clinical  observations.  Premature Infant recommended reference ranges:  Up to 24 hours.............<8.0 mg/dL  Up to 48 hours............<12.0 mg/dL  3-5 days..................<15.0 mg/dL  6-29 days.................<15.0 mg/dL       Bilirubin Total   Date Value Ref Range Status   05/08/2024 0.8 <=1.5 mg/dL Final     Alkaline Phosphatase   Date Value Ref Range Status   12/30/2014 79 55 - 135 U/L Final     ALP   Date Value Ref Range Status   05/08/2024 69 40 - 150 unit/L Final     AST   Date Value Ref Range Status   05/08/2024 21 5 - 34 unit/L Final   12/30/2014 21 10 - 40 U/L Final     ALT   Date Value Ref Range Status   05/08/2024 11 0 - 55 unit/L Final   12/30/2014 18 10 - 44 U/L Final     Anion Gap   Date Value Ref Range Status   01/02/2015 10 8 - 16 mmol/L Final     eGFR if    Date Value Ref Range Status   01/02/2015 >60.0 >60 mL/min/1.73 m^2 Final     eGFR if non    Date Value Ref Range Status   01/02/2015 >60.0 >60 mL/min/1.73 m^2 Final     Comment:     Calculation used to obtain the estimated glomerular filtration  rate (eGFR) is the CKD-EPI equation. Since race is unknown   in our information system, the eGFR values for   -American and Non--American patients are given   for each creatinine result.       Estimated GFR-Non    Date Value Ref Range Status   03/23/2022 >60       BNP   Date Value Ref Range Status   12/30/2014 103 (H) 0 - 99 pg/mL Final     Comment:     Values of less than 100 pg/ml are consistent with non-CHF populations.      Cardiac MRI October 1, 2020:  Top normal, mildly increased right ventricular volumes. (RVEDV 101 cc/m2 for BSA, RVESV 54 cc/m2 for BSA).  Slight decrease in RV volumes for BSA in the setting of increased BSA when compared to MRI 2016. RVEF 47 %.  Normal left ventricular volume with LVEF 62 %.  RVOT obscured by artifact from the sternum.  Well-seated bioprosthetic pulmonary valve. Pulmonary  insufficiency with regurgitant fraction of 19% and regurgitation volume of 13 cc. Peak velocity 1.9m/sec.   Normal size of main and branch pulmonary arteries.   Aortic insufficiency, regurgitant fraction 3%, regurgitant volume 2 cc.   Top normal size of the aortic root with mildly dilated ascending aorta. No change in size of the ascending aorta when compared to MRI 2016.  Tricuspid valve regurgitation noted.    CPX testing 2016:  4) The PkVO2 was 20.6 ml/kg/min which is 55% of predicted equating to a functional capacity of 5.9 METS indicating moderate functional impairment.   CONCLUSIONS   CPX Conclusions:  Moderate functional impairment associated with a normal breathing reserve, normal oxygen saturation, an adequate effort, and a borderline reduced AT. These findings are indicative of functional impairment secondary to circulatory insufficiency.     Cardiac MRI 12/2016:  Conclusion:   27 yo with tetralogy of Fallot s/p repair, s/p pulmonary valve replacement.   1. The right ventricle end diastolic volume is top normal, end systolic volume is increased. (RVEDV 110 cc/m2 for BSA, RVESV 65 cc/m2 for BSA).  RVEF 41%.  2. Normal left ventricular size with LVEF 60 %.  3. There is a pulmonary valve prosthesis. The RVOT was difficult to visualize due to artifact from sternal wires. Pulmonary insufficiency, regurgitant fraction 14%. Peak velocity 1.7 m/sec.  4. Normal size of main and RPA, mild LPA hypoplasia.   5. Aortic insufficiency, regurgitant fraction 11%, regurgitant volume 7 cc.   6. Dilated ascending aorta, meaurements as above.   7. Right atrial enlargement.   8. Tricuspid regurgitation noted.     Sincerely,    Mazin Catalan MD  Pediatric Cardiology  Adult Congenital Heart Disease  [facility] [address] [place] [address]  [phone]

## 2024-12-18 LAB
OHS QRS DURATION: 192 MS
OHS QTC CALCULATION: 505 MS

## 2025-01-27 ENCOUNTER — PATIENT MESSAGE (OUTPATIENT)
Dept: PRIMARY CARE CLINIC | Facility: CLINIC | Age: 35
End: 2025-01-27
Payer: MEDICARE

## 2025-01-27 RX ORDER — POTASSIUM CHLORIDE 750 MG/1
10 TABLET, EXTENDED RELEASE ORAL
Qty: 30 TABLET | Refills: 5 | Status: SHIPPED | OUTPATIENT
Start: 2025-01-27

## 2025-01-27 RX ORDER — POTASSIUM CHLORIDE 750 MG/1
10 TABLET, EXTENDED RELEASE ORAL
Qty: 30 TABLET | Refills: 5 | OUTPATIENT
Start: 2025-01-27

## 2025-02-11 RX ORDER — HYDROCHLOROTHIAZIDE 25 MG/1
25 TABLET ORAL
Qty: 30 TABLET | Refills: 6 | Status: SHIPPED | OUTPATIENT
Start: 2025-02-11

## 2025-03-24 RX ORDER — ATORVASTATIN CALCIUM 10 MG/1
10 TABLET, FILM COATED ORAL
Qty: 30 TABLET | Refills: 3 | Status: SHIPPED | OUTPATIENT
Start: 2025-03-24

## 2025-03-30 RX ORDER — METOPROLOL SUCCINATE 50 MG/1
50 TABLET, EXTENDED RELEASE ORAL DAILY
COMMUNITY
Start: 2025-02-03

## 2025-03-31 NOTE — PATIENT INSTRUCTIONS
Reagan Monreal,     If you are due for any health screening(s) below please notify me so we can arrange them to be ordered and scheduled. Most healthy patients at your age complete them, but you are free to accept or refuse.     If you can't do it, I'll definitely understand. If you can, I'd certainly appreciate it!    All of your core healthy metrics are met.    5-10 year preventative plan discussed.                 Patient Education       Weight Loss Tips   About this topic   More and more people are concerned about their weight. You can choose from many different programs. The goal of a weight loss program may be to cut down on calories or to lose extra weight through exercise.  Losing weight may mean changing your ideas about food. Going on a diet and losing weight does not mean starving yourself. It means cutting down on the amount of food you eat, making healthy food choices, and being active.  General   Ideally, you need to lose 1 to 2 pounds (0.5 to 1 kg) a week for a healthy weight loss. Losing too much weight too fast is not good. When you take in fewer calories, you will lose weight. Your ideal calorie and weight goal depends on your current age, weight, height, and personal goals. Ask your doctor or dietitian what your ideal weight is.  You need to burn 3500 calories to lose 1 pound (0.5 kg). That means cutting out 500 calories every day for 7 days. You can cut out 500 calories per day by eating or drinking fewer calories, burning them through exercise, or doing both. To lose that extra weight and stay healthy:  Take time to exercise.  Exercise regularly. Burn calories with activity and exercise. Exercise can help you lose weight and it also strengthens your muscles. Set a schedule where you will have time to do exercises. With just 30 to 60 minutes of exercise each day, you could burn 500 extra calories. Your metabolism stays elevated for a period of time after exercise.  If you don't have time for a 30  "minute workout, try three 10 minute exercises each day.  If you work near your home, walk to work. Walking is a very good form of exercise.  Take a 20 minute walk each day. Walk during your lunch break. Park far away, so you have to walk more.  Take the steps instead of elevators. You will burn more calories this way.  If you have an illness, like diabetes or high blood pressure, ask your doctor how much exercise is right for you.  Choose healthy snacks.  Low calorie healthy snacks are a good thing. They help your blood sugar stay even and prevent you from overeating at meals. Choose a balanced snack, such as a small apple with 2 tablespoons (30 grams) of peanut butter.  Keep in mind, even "low calorie" foods can add up. Just because you choose low calorie foods does not mean you do not have to count the calories you eat.  Pack a few fresh fruits or a small salad to take to work or school. Avoid buying a snack at the nearest vending machine.  When you feel thirsty, drink water. Water has no calories and is a very good thirst quencher.  Plan healthy meals.  Plan ahead. Keep a diary of foods that are low in calories. You can also make a list of meal plans for your breakfast, lunch, and dinner. Planning ahead will prevent you from eating out at a fast food place or restaurant.  Make a grocery list before shopping so you only buy food you need. Don't go to the store hungry.  Visit a dietitian. This person will help you make meal plans that will help you lose weight.  Add fiber to your meals. Adding fiber helps you to feel full for a longer amount of time.  Take care when eating out.  Choose lower fat and lower calorie meals. Try a seafood, lean meat, or vegetarian entrée.  Share a meal with a friend.  Try a salad and appetizer instead of an entree.  Ask for a to-go box when dinner is served and put half of your meal in it for a later meal.  Have fruit for dessert.  Drink water instead of other high calorie " drinks.  Learn not to overeat.  Watch your portions. For example, the recommended serving size of meat is 3 ounces (90 grams). This is the size of a deck of playing cards. Two tablespoons (30 grams) of peanut butter is the size of a ping-pong ball. One medium fruit is the size of a baseball.  Use a smaller plate or glass during dinner for less calorie intake.  Try drinking a glass or two of water before eating. This may make you feel more full and help you to eat less.  Eat slowly. Take at least 30 minutes to eat. This gives time for your brain to tell your stomach you are full. This will help you avoid overeating.  Some people eat smaller meals more often to help not to overeat. If you can eat six small meals, make them healthy and low calorie. If three meals are best for you, know your calorie level for the day and spread it out into three healthy low calorie meals.     What will the results be?   Losing weight may make you healthier. You also may have more energy for your daily activities. You may lower disease risk. You may also add years to your life.  What changes to diet are needed?   Learn how to read nutrition labels. Know the serving size. Knowing the calories in an item will help you make healthier choices and lose weight.  Keep a diary of the food you eat. This will help you count the calories you are taking in.  Make a menu in advance. This will help you make good choices to include in your diet.  Avoid eating 2 hours before bedtime to allow for digestion. If you eat right before you go to bed, you may also have worse heartburn.  Be sure to count the calories in the things you drink. You may want to stop drinking soda pop, beer, wine, and mixed drinks (alcohol). Some coffee drinks also have a lot of calories in them.  Who should use this diet?   A weight loss diet may be needed for people with a calculated body mass index of 25 and over. This means you are overweight or obese.  Who should not use this  diet?   People with BMI of 18.5 or lower should not use this diet. Do not use this diet if your doctor does not recommend weight loss.  What foods are good to eat?   Choose foods that are nutritious. Remember, portion control is key. Even a low calorie food can become high in calories if you have too big of a serving. Here is a list of foods that are good to eat:  Vegetables:   Broccoli  Asparagus  Spinach  Green leafy vegetables  Tomatoes  Onions  Mushrooms  Cucumbers  Zucchini  Lean proteins:   Egg whites  Beans including kidney, navy, black, and chickpeas  Grilled, broiled, or baked skinless chicken breast  Grilled, broiled, or baked skinless turkey breast  Lean beef  [place] meat  Grilled, broiled, or baked fish  Beans  Nuts, such as almonds, cashews, and pistachios  Seeds  Whole grains and carbs:   Oatmeal  Brown rice  Sweet potatoes  Cereal  Whole grain bread or pasta  Fruits:   Apples  Grapefruit  Blueberries  Oranges  Bananas  Grapes  Peaches  Pineapple  Strawberries  Dairy:   Fat-free or low-fat milk and cheese  Low fat yogurt  Soy, rice, or almond milk  What foods should be limited or avoided?   Limit or avoid foods that are high in calories like:  Junk foods  Fried foods  Fatty foods  Processed meats  Food with saturated and trans fat  Whole fat dairy products  Butter  Cheese  Ice cream  Food and drinks with a lot of sugar. Some examples are beer, wine, mixed drinks (alcohol), carbonated sodas, cakes, and cookies.  When do I need to call the doctor?   Weakness  Fast heartbeat  Dizziness  Helpful tips   Join a support group and an exercise group. It is much easier to lose weight if you have support and encouragement.  Do not skip meals. If you skip a meal, you most likely will overeat at that next meal.  Eat at the dining room table instead in front of the TV to help monitor intake.  Where can I learn more?   [employer]y of [employer] and  Dietetics  https://www.eatright.org/health/weight-loss/your-health-and-your-weight/back-to-basics-for-healthy-weight-loss   Centers for Disease Control and Prevention  https://www.cdc.gov/healthyweight/   Weight-Control Information Network  https://www.niddk.nih.gov/health-information/diet-nutrition/changing-habits-better-health   Last Reviewed Date   2021-08-09  Consumer Information Use and Disclaimer   This information is not specific medical advice and does not replace information you receive from your health care provider. This is only a brief summary of general information. It does NOT include all information about conditions, illnesses, injuries, tests, procedures, treatments, therapies, discharge instructions or life-style choices that may apply to you. You must talk with your health care provider for complete information about your health and treatment options. This information should not be used to decide whether or not to accept your health care providers advice, instructions or recommendations. Only your health care provider has the knowledge and training to provide advice that is right for you.  Copyright   Copyright © 2021 [employer] and its affiliates and/or licensors. All rights reserved.    Patient Education       Health Risks of a High BMI   About this topic   Your weight and health depend on a few things. Doctors use a method called BMI or body mass index as a tool to learn more about your risk of having health problems. This tool uses your weight and your height to find your BMI.  BMI does not include things like your habits, where you live, family history, or amount of body fat. Some people with a normal BMI are still not healthy. Other people with a high BMI may be healthy. Most of the time, a person with a higher BMI is less healthy and will need more care. Ask your doctor for their view of your total health during a well visit or physical.  Being overweight or having a high BMI can hurt  many parts of your body. Learn about your BMI and how your weight changes your health risks. Then you can make changes to keep yourself as healthy as you can.  General   Weighing too much can be very harmful to your body. It can cause many illnesses and can make it hard to move about.  Blood Sugar   You have a greater chance of having high blood sugar or diabetes if you weigh too much. Your body normally makes a hormone called insulin. The insulin allows your body to use the sugar in your blood.  The cells in your body may not be able to use insulin. Then your cells cannot get the sugar from your bloodstream that they need for energy. Your pancreas has to work extra hard to try and make enough insulin to keep your blood sugar healthy.  If you lose weight and exercise, your body is able to control your blood sugar levels better. You may not need as much insulin to keep your blood sugar levels healthy.  Your Heart   Being overweight makes your heart work harder.  The blood vessels that bring blood to your heart muscle may become narrow or blocked and cause a heart attack or your heart may not pump as well as it should. Your heart rate may not be normal.  Losing weight can lower your chances of having problems with your heart.  High Blood Pressure   Your heart has to work harder to pump blood through a larger body and to make sure all of your cells have the oxygen they need.  As your heart has to work harder, your blood pressure goes up.  Being overweight can also harm your kidneys and this may also raise your blood pressure.  You may be able to lower your blood pressure through weight loss and routine exercise.  Stroke   Strokes are more likely to happen when someone has high blood pressure, heart problems, high blood sugar, or high cholesterol.  Being overweight puts you at a higher risk for all of these health problems. These problems put you at a higher risk for having a stroke.  Losing weight may help lower your  blood pressure, which is the biggest risk factor for a stroke.  Cholesterol   Your cholesterol level is likely to be higher if you are overweight.  This can lead to narrowing of the blood vessels in your heart, neck, or other parts of your body. Then you may have chest pain or signs of low blood flow to a certain area. It can also lead to a heart attack or stroke.  Lowering your weight and changing what you eat may change your cholesterol levels.  Your Liver and Kidneys   You are more likely to have certain problems with your liver or kidneys if you have a high BMI.  Fatty liver disease is caused by a buildup of fat in your liver. Then your liver may not work as well as it should.  You are at a higher risk for diabetes if you are overweight. This illness can cause kidney problems.  There is no exact way to treat fatty liver disease. By losing weight, you may keep your liver from getting any worse and help your liver work better.  By losing weight, you lower your chance of having kidney problems. If you already have kidney problems, weight loss may help keep your disease from getting worse.  Bone and Joint Problems   Weighing too much can put a lot of stress on your joints.  The extra weight may make the cartilage wear away more quickly from your bones. Your cartilage lines the surfaces of your bones to help your joints glide more easily.  When your cartilage is worn, your joints become stiff and sore.  Losing weight and exercising is one of the best ways to treat joint pain and stiffness. It can also ease the stress on your hips, knees, and back.  Cancer Risk   Gaining weight and poor health habits raise your risk for some kinds of cancer.  Healthy eating and exercise may lower your risk for some kinds of cancer.  Sleep   With a high BMI, you may have extra fat around your neck. This can make your airway smaller and put you at risk for a problem called sleep apnea. With this problem, your breathing starts and stops  when you are sleeping.  Signs of sleep apnea include snoring, feeling sleepy during the day, and problems with focusing.  Sleep apnea can lead to heart problems such as increased risk for heart disease and arrhythmias like atrial fibrillation.  Losing weight can lower the amount of fat around your neck and ease sleep apnea problems.  Pregnancy   Your weight can affect how often you have a period. It may also make it harder for you to get pregnant. A high BMI can cause problems for both mom and baby.  If you are overweight and pregnant you are at a higher risk for high blood sugar or high blood pressure while you are pregnant.  You are also more likely to have your baby early or to need a C section.  Losing weight before you become pregnant may lower your chance for these problems. If you are pregnant, talk to your doctor before you try to lose weight.  Depression   You are more likely to suffer from depression or low mood when you have a high BMI.  You may have a low mood because of low self-esteem, lack of activity, lack of sleep, and health problems caused by being overweight.  Losing weight and finding out the reasons you overeat are some of the steps to improve your quality of life and deal with depression.  Where can I learn more?   [employer] [place] of Diabetes and Digestive and Kidney Diseases  https://www.niddk.nih.gov/health-information/weight-management/adult-overweight-obesity/health-risks   Last Reviewed Date   2021-09-15  Consumer Information Use and Disclaimer   This information is not specific medical advice and does not replace information you receive from your health care provider. This is only a brief summary of general information. It does NOT include all information about conditions, illnesses, injuries, tests, procedures, treatments, therapies, discharge instructions or life-style choices that may apply to you. You must talk with your health care provider for complete information about your  health and treatment options. This information should not be used to decide whether or not to accept your health care providers advice, instructions or recommendations. Only your health care provider has the knowledge and training to provide advice that is right for you.  Copyright   Copyright © 2021 [employer] and its affiliates and/or licensors. All rights reserved.

## 2025-03-31 NOTE — PROGRESS NOTES
Patient ID: [id number]     Chief Complaint: Medicare AWV      HPI:     Jocelin Longo is a 35 y.o. female here today for a Medicare Wellness. She notes no recent doctor visit. She thinks she is due in December for her heart visit. She has no real complaints.       Opioid Screening: Patient medication list reviewed, patient is not taking prescription opioids. Patient is not using additional opioids than prescribed. Patient is at low risk of substance abuse based on this opioid use history.       Past Medical History:   Diagnosis Date    Anticoagulant long-term use     Asthma     COVID-19 05/15/2022    Hypertension     Hypothyroid     Mild aortic insufficiency     Pulmonary insufficiency     TOF (tetralogy of Fallot)     Ventricular tachycardia         Past Surgical History:   Procedure Laterality Date    CARDIAC SURGERY      PVR    DENTAL SURGERY      EYE SURGERY      TETRALOGY OF FALLOT REPAIR      TONSILLECTOMY         Review of patient's allergies indicates:  No Known Allergies    Outpatient Medications Marked as Taking for the 4/2/25 encounter (Office Visit) with Colleen Hull MD   Medication Sig Dispense Refill    aspirin (ECOTRIN) 81 MG EC tablet Take 81 mg by mouth once daily.      atorvastatin (LIPITOR) 10 MG tablet TAKE ONE TABLET by mouth EVERY DAY 30 tablet 3    clotrimazole (LOTRIMIN) 1 % cream Apply topically 2 (two) times daily. 45 g 1    clotrimazole-betamethasone 1-0.05% (LOTRISONE) cream Apply topically 2 (two) times daily. 45 g 1    hydroCHLOROthiazide (HYDRODIURIL) 25 MG tablet TAKE 1 TABLET by mouth EVERY DAY 30 tablet 6    ibuprofen (ADVIL,MOTRIN) 800 MG tablet Take 800 mg by mouth every 8 (eight) hours as needed for Pain.      levothyroxine (SYNTHROID) 50 MCG tablet Take 1 tablet (50 mcg total) by mouth once daily. 90 tablet 3    losartan (COZAAR) 50 MG tablet Take 1 tablet (50 mg total) by mouth 2 (two) times daily. 60 tablet 4    metoprolol succinate (TOPROL-XL) 100 MG 24 hr tablet Take 1  tablet (100 mg total) by mouth once daily. 30 tablet 6    metoprolol succinate (TOPROL-XL) 50 MG 24 hr tablet Take 50 mg by mouth once daily.      potassium chloride (KLOR-CON) 10 MEQ TbSR TAKE 1 TABLET BY MOUTH DAILY 30 tablet 5    sertraline (ZOLOFT) 25 MG tablet TAKE 1 TABLET by mouth DAILY 30 tablet 11       Social History[1]     Family History   Problem Relation Name Age of Onset    No Known Problems Mother      No Known Problems Father      No Known Problems Sister      No Known Problems Brother      Hypertension Maternal Grandmother      Hypertension Maternal Grandfather      Heart disease Maternal Grandfather      Heart disease Paternal Grandmother      No Known Problems Paternal Grandfather      No Known Problems Maternal Aunt      Stroke Maternal Uncle      Hypertension Maternal Uncle      Fainting Maternal Uncle      Coronary artery disease Maternal Uncle      Epilepsy Maternal Uncle      No Known Problems Paternal Aunt      No Known Problems Paternal Uncle      Anemia Neg Hx      Arrhythmia Neg Hx      Asthma Neg Hx      Clotting disorder Neg Hx      Heart attack Neg Hx      Heart failure Neg Hx      Hyperlipidemia Neg Hx      Atrial Septal Defect Neg Hx          Patient Care Team:  Colleen Hull MD as PCP - General (Internal Medicine)  Colleen Hull MD King, Mika M., MD as Consulting Physician (Obstetrics and Gynecology)  Mazin Catalan MD as Consulting Physician (Pediatric Cardiology)  Teena Beyer MD as Consulting Physician (Urology)  Ko Rudolph MD as Consulting Physician (Otolaryngology)       Subjective:     Review of Systems   Constitutional: Negative.    HENT: Negative.     Eyes: Negative.         Last eye was about a year ago, she's due at the end of this month   Respiratory: Negative.     Cardiovascular: Negative.  Negative for leg swelling.   Gastrointestinal: Negative.    Genitourinary: Negative.    Musculoskeletal:  Positive for joint pain.        Right knee  "will pop at times.    Skin: Negative.    Neurological: Negative.    Endo/Heme/Allergies: Negative.    Psychiatric/Behavioral: Negative.          Mood is good, she finds medication is working well. She doesn't want to decrease it though.            Objective:     /85   Pulse (!) 52   Temp 97.8 °F (36.6 °C) (Oral)   Resp 15   Ht 5' 2" (1.575 m)   Wt 79.4 kg (175 lb)   LMP 03/29/2025   SpO2 97%   BMI 32.01 kg/m²     Physical Exam  Vitals reviewed.   Constitutional:       Appearance: She is obese.   HENT:      Head: Normocephalic and atraumatic.      Right Ear: Tympanic membrane, ear canal and external ear normal.      Left Ear: Tympanic membrane and external ear normal.      Mouth/Throat:      Mouth: Mucous membranes are moist.      Pharynx: No oropharyngeal exudate or posterior oropharyngeal erythema.   Eyes:      General: No scleral icterus.     Extraocular Movements: Extraocular movements intact.      Conjunctiva/sclera: Conjunctivae normal.      Pupils: Pupils are equal, round, and reactive to light.   Neck:      Vascular: No carotid bruit.   Cardiovascular:      Rate and Rhythm: Regular rhythm. Bradycardia present.      Heart sounds: Murmur heard.   Pulmonary:      Effort: Pulmonary effort is normal.      Breath sounds: No wheezing or rhonchi.   Abdominal:      Palpations: Abdomen is soft.      Tenderness: There is no abdominal tenderness. There is no guarding.   Musculoskeletal:         General: No tenderness. Normal range of motion.      Cervical back: Normal range of motion.      Comments: Knee moves smoothly   Lymphadenopathy:      Cervical: No cervical adenopathy.   Skin:     General: Skin is warm and dry.      Coloration: Skin is pale.   Neurological:      Mental Status: She is alert and oriented to person, place, and time.      Sensory: No sensory deficit.      Motor: No weakness.      Gait: Gait normal.   Psychiatric:         Mood and Affect: Mood normal.         Behavior: Behavior normal.    "      Thought Content: Thought content normal.         Judgment: Judgment normal.           A comprehensive HEALTH RISK ASSESSMENT was completed today. Results are summarized below:    There are NO EMOTIONAL/SOCIAL CONCERNS identified on today's screening for Social Isolation, Depression and Anxiety.    There are NO COGNITIVE FUNCTION CONCERNS identified on today's screening.  The following FUNCTIONAL AND/OR SAFETY CONCERNS were identified on today's screening for Physical Symptoms, Nutritional, Home Safety/Living Situation, Fall Risk, Activities of Daily Living, Independent Activities of Daily Living, Physical Activity,Timed Up and Go test and Whisper test::    *Patient reports NO PREVENTIVE HOME HAZARD EVALUATION OR MODIFICATION. (Have you or someone else evaluated or modified your home with additional safety features like handrails on all stairs, installed grab bars in the bathroom, secured loose rugs and ensured good lighting in all areas?: (!) No)    The patient reports NO OPIOID PRESCRIPTIONS. This was confirmed through medication reconciliation and the [facility] website.    The patient is NOT A TOBACCO USER.  The patient reports NO SIGNIFICANT ALCOHOL USE.     All Questions regarding food, transportation or housing were not answered today.    Assessment/Plan:     1. Welcome to Medicare preventive visit  Comments:  Discussed advanced directives and opiate risks. Up to date on screening, lab due today    2. Essential hypertension  Comments:  At goal on metoprolol, losartan and HCTZ  Orders:  -     Comprehensive Metabolic Panel  -     Lipid Panel  -     Urinalysis, Reflex to Urine Culture    3. Adult congenital heart disease  Comments:  Has follow up in [place] in December  Orders:  -     CBC Auto Differential    4. Nonrheumatic aortic valve insufficiency  Comments:  Asymptomatic  Overview:  dilated ascending aorta      5. Mild intermittent asthma without complication  -     CBC Auto Differential    6. Mixed  anxiety depressive disorder  Comments:  Ok to try every other day on Sertraline    7. S/P pulmonary valve replacement with bioprosthetic valve  Comments:  Will have [facility] follow up in [place]    8. Tetralogy of Fallot s/p repair  Comments:  Doing well    9. Acquired hypothyroidism  Comments:  On 50 mcg, will recheck lab today  Orders:  -     TSH  -     T4, Free    10. Gastroesophageal reflux disease without esophagitis  Comments:  Controlling with diet.    11. Drug-induced bradycardia  Comments:  asymptomatic           Medicare Annual Wellness and Personalized Prevention Plan:   Fall Risk + Home Safety + Hearing Impairment + Depression Screen + Opioid and Substance Abuse Screening + Cognitive Impairment Screen + Health Risk Assessment all reviewed.     Health Maintenance Topics with due status: Not Due       Topic Last Completion Date    Cervical Cancer Screening 05/26/2022    TETANUS VACCINE 11/16/2023    Hemoglobin A1c (Diabetic Prevention Screening) 03/27/2024    RSV Vaccine (Age 60+ and Pregnant patients) Not Due      The patient's Health Maintenance was reviewed and the following appears to be due at this time:   Health Maintenance Due   Topic Date Due    HIV Screening  Never done       Advance Care Planning   I attest to discussing Advance Care Planning with patient and/or family member.  Education was provided including the importance of the Health Care Power of , Advance Directives, and/or [place] documentation.  The patient expressed understanding to the importance of this information and discussion.         Follow up in about 1 year (around 4/3/2026), or if symptoms worsen or fail to improve, for Medicare AWV. In addition to their scheduled follow up, the patient has also been instructed to follow up on as needed basis.            [1]   Social History  Socioeconomic History    Marital status: Single   Tobacco Use    Smoking status: Never    Smokeless tobacco: Never   Substance and Sexual  Activity    Alcohol use: Never     Comment: socially    Drug use: Never   Social History Narrative    Lives with parents and older brother.  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      Social Drivers of Health     Financial Resource Strain: Low Risk  (3/26/2025)    Overall Financial Resource Strain (CARDIA)     Difficulty of Paying Living Expenses: Not hard at all   Food Insecurity: No Food Insecurity (3/26/2025)    Hunger Vital Sign     Worried About Running Out of Food in the Last Year: Never true     Ran Out of Food in the Last Year: Never true   Transportation Needs: No Transportation Needs (3/26/2025)    PRAPARE - Transportation     Lack of Transportation (Medical): No     Lack of Transportation (Non-Medical): No   Physical Activity: Inactive (3/26/2025)    Exercise Vital Sign     Days of Exercise per Week: 0 days     Minutes of Exercise per Session: 0 min   Stress: No Stress Concern Present (3/26/2025)    [employer] [place] [employer] - Occupational Stress Questionnaire     Feeling of Stress : Not at all   Housing Stability: Low Risk  (3/26/2025)    Housing Stability Vital Sign     Unable to Pay for Housing in the Last Year: No     Number of Times Moved in the Last Year: 0     Homeless in the Last Year: No

## 2025-04-02 ENCOUNTER — OFFICE VISIT (OUTPATIENT)
Dept: PRIMARY CARE CLINIC | Facility: CLINIC | Age: 35
End: 2025-04-02
Payer: MEDICARE

## 2025-04-02 VITALS
HEIGHT: 62 IN | DIASTOLIC BLOOD PRESSURE: 85 MMHG | SYSTOLIC BLOOD PRESSURE: 123 MMHG | OXYGEN SATURATION: 97 % | HEART RATE: 52 BPM | WEIGHT: 175 LBS | BODY MASS INDEX: 32.2 KG/M2 | TEMPERATURE: 98 F | RESPIRATION RATE: 15 BRPM

## 2025-04-02 DIAGNOSIS — F41.8 MIXED ANXIETY DEPRESSIVE DISORDER: ICD-10-CM

## 2025-04-02 DIAGNOSIS — Z87.74 TETRALOGY OF FALLOT S/P REPAIR: ICD-10-CM

## 2025-04-02 DIAGNOSIS — K21.9 GASTROESOPHAGEAL REFLUX DISEASE WITHOUT ESOPHAGITIS: ICD-10-CM

## 2025-04-02 DIAGNOSIS — R00.1 DRUG-INDUCED BRADYCARDIA: ICD-10-CM

## 2025-04-02 DIAGNOSIS — I10 ESSENTIAL HYPERTENSION: ICD-10-CM

## 2025-04-02 DIAGNOSIS — Q24.9 ADULT CONGENITAL HEART DISEASE: ICD-10-CM

## 2025-04-02 DIAGNOSIS — E03.9 ACQUIRED HYPOTHYROIDISM: ICD-10-CM

## 2025-04-02 DIAGNOSIS — Z00.00 WELCOME TO MEDICARE PREVENTIVE VISIT: Primary | ICD-10-CM

## 2025-04-02 DIAGNOSIS — T50.905A DRUG-INDUCED BRADYCARDIA: ICD-10-CM

## 2025-04-02 DIAGNOSIS — Z95.3 S/P PULMONARY VALVE REPLACEMENT WITH BIOPROSTHETIC VALVE: ICD-10-CM

## 2025-04-02 DIAGNOSIS — I35.1 NONRHEUMATIC AORTIC VALVE INSUFFICIENCY: ICD-10-CM

## 2025-04-02 DIAGNOSIS — J45.20 MILD INTERMITTENT ASTHMA WITHOUT COMPLICATION: ICD-10-CM

## 2025-04-02 LAB
ALBUMIN SERPL-MCNC: 4.4 G/DL (ref 3.9–4.9)
ALP SERPL-CCNC: 68 IU/L (ref 44–121)
ALT SERPL-CCNC: 11 IU/L (ref 0–32)
APPEARANCE UR: CLEAR
AST SERPL-CCNC: 19 IU/L (ref 0–40)
BACTERIA #/AREA URNS HPF: NORMAL /[HPF]
BASOPHILS # BLD AUTO: 0 X10E3/UL (ref 0–0.2)
BASOPHILS NFR BLD AUTO: 1 %
BILIRUB SERPL-MCNC: 0.7 MG/DL (ref 0–1.2)
BILIRUB UR QL STRIP: NEGATIVE
BUN SERPL-MCNC: 10 MG/DL (ref 6–20)
BUN/CREAT SERPL: 13 (ref 9–23)
CALCIUM SERPL-MCNC: 9.2 MG/DL (ref 8.7–10.2)
CHLORIDE SERPL-SCNC: 101 MMOL/L (ref 96–106)
CHOLEST SERPL-MCNC: 118 MG/DL (ref 100–199)
CO2 SERPL-SCNC: 22 MMOL/L (ref 20–29)
COLOR UR: YELLOW
CREAT SERPL-MCNC: 0.75 MG/DL (ref 0.57–1)
CRYSTALS URNS MICRO: NORMAL
EOSINOPHIL # BLD AUTO: 0.1 X10E3/UL (ref 0–0.4)
EOSINOPHIL NFR BLD AUTO: 1 %
EPI CELLS #/AREA URNS HPF: NORMAL /HPF (ref 0–10)
ERYTHROCYTE [DISTWIDTH] IN BLOOD BY AUTOMATED COUNT: 13.8 % (ref 11.7–15.4)
EST. GFR  (NO RACE VARIABLE): 105 ML/MIN/1.73
GLOBULIN SER CALC-MCNC: 2.3 G/DL (ref 1.5–4.5)
GLUCOSE SERPL-MCNC: 85 MG/DL (ref 70–99)
GLUCOSE UR QL STRIP: NEGATIVE
HCT VFR BLD AUTO: 39.3 % (ref 34–46.6)
HDLC SERPL-MCNC: 32 MG/DL
HGB BLD-MCNC: 13.1 G/DL (ref 11.1–15.9)
HGB UR QL STRIP: NEGATIVE
IMM GRANULOCYTES NFR BLD AUTO: 0 %
KETONES UR QL STRIP: NEGATIVE
LDLC SERPL CALC-MCNC: 65 MG/DL (ref 0–99)
LEUKOCYTE ESTERASE UR QL STRIP: NEGATIVE
LYMPHOCYTES # BLD AUTO: 1 X10E3/UL (ref 0.7–3.1)
LYMPHOCYTES NFR BLD AUTO: 24 %
MCH RBC QN AUTO: 29.2 PG (ref 26.6–33)
MCHC RBC AUTO-ENTMCNC: 33.3 G/DL (ref 31.5–35.7)
MCV RBC AUTO: 88 FL (ref 79–97)
MICRO URNS: NORMAL
MICRO URNS: NORMAL
MONOCYTES # BLD AUTO: 0.3 X10E3/UL (ref 0.1–0.9)
MONOCYTES NFR BLD AUTO: 8 %
NEUTROPHILS # BLD AUTO: 2.9 X10E3/UL (ref 1.4–7)
NEUTROPHILS NFR BLD AUTO: 66 %
NITRITE UR QL STRIP: NEGATIVE
PH UR STRIP: 7 [PH] (ref 5–7.5)
PLATELET # BLD AUTO: 156 X10E3/UL (ref 150–450)
POTASSIUM SERPL-SCNC: 3.5 MMOL/L (ref 3.5–5.2)
PROT SERPL-MCNC: 6.7 G/DL (ref 6–8.5)
PROT UR QL STRIP: NEGATIVE
RBC # BLD AUTO: 4.48 X10E6/UL (ref 3.77–5.28)
RBC #/AREA URNS HPF: NORMAL /HPF (ref 0–2)
SODIUM SERPL-SCNC: 142 MMOL/L (ref 134–144)
SP GR UR STRIP: 1.01 (ref 1–1.03)
T4 FREE SERPL-MCNC: 1.52 NG/DL (ref 0.82–1.77)
TRIGL SERPL-MCNC: 113 MG/DL (ref 0–149)
TSH SERPL DL<=0.005 MIU/L-ACNC: 3.75 UIU/ML (ref 0.45–4.5)
URINALYSIS REFLEX: NORMAL
UROBILINOGEN UR STRIP-MCNC: 0.2 MG/DL (ref 0.2–1)
VLDLC SERPL CALC-MCNC: 21 MG/DL (ref 5–40)
WBC # BLD AUTO: 4.3 X10E3/UL (ref 3.4–10.8)
WBC #/AREA URNS HPF: NORMAL /HPF (ref 0–5)

## 2025-04-02 PROCEDURE — G0438 PPPS, INITIAL VISIT: HCPCS | Mod: ,,, | Performed by: INTERNAL MEDICINE

## 2025-04-02 PROCEDURE — 3074F SYST BP LT 130 MM HG: CPT | Mod: CPTII,,, | Performed by: INTERNAL MEDICINE

## 2025-04-02 PROCEDURE — 1159F MED LIST DOCD IN RCRD: CPT | Mod: CPTII,,, | Performed by: INTERNAL MEDICINE

## 2025-04-02 PROCEDURE — 36415 COLL VENOUS BLD VENIPUNCTURE: CPT | Mod: ,,, | Performed by: INTERNAL MEDICINE

## 2025-04-02 PROCEDURE — 4010F ACE/ARB THERAPY RXD/TAKEN: CPT | Mod: CPTII,,, | Performed by: INTERNAL MEDICINE

## 2025-04-02 PROCEDURE — 3079F DIAST BP 80-89 MM HG: CPT | Mod: CPTII,,, | Performed by: INTERNAL MEDICINE

## 2025-04-02 PROCEDURE — 1160F RVW MEDS BY RX/DR IN RCRD: CPT | Mod: CPTII,,, | Performed by: INTERNAL MEDICINE

## 2025-04-03 ENCOUNTER — TELEPHONE (OUTPATIENT)
Dept: PRIMARY CARE CLINIC | Facility: CLINIC | Age: 35
End: 2025-04-03
Payer: MEDICARE

## 2025-04-03 ENCOUNTER — RESULTS FOLLOW-UP (OUTPATIENT)
Dept: PRIMARY CARE CLINIC | Facility: CLINIC | Age: 35
End: 2025-04-03

## 2025-04-03 DIAGNOSIS — Z51.89 ENCOUNTER FOR MEDICATION ADJUSTMENT: ICD-10-CM

## 2025-04-03 DIAGNOSIS — E03.9 ACQUIRED HYPOTHYROIDISM: Primary | ICD-10-CM

## 2025-04-03 RX ORDER — LEVOTHYROXINE SODIUM 75 UG/1
75 TABLET ORAL
Qty: 30 TABLET | Refills: 11 | Status: SHIPPED | OUTPATIENT
Start: 2025-04-03 | End: 2026-04-03

## 2025-04-03 NOTE — TELEPHONE ENCOUNTER
----- Message from Colleen Hull MD sent at 4/3/2025  8:20 AM CDT -----  Lab looks good, cholesterol is excellent. The thyroid went up, if not missed doses it's probably time to increase the dose (then recheck in a month)  ----- Message -----  From: Dami Maxwell  Sent: 4/2/2025   8:08 PM CDT  To: Colleen Hull MD

## 2025-04-30 RX ORDER — LOSARTAN POTASSIUM 50 MG/1
50 TABLET ORAL 2 TIMES DAILY
Qty: 60 TABLET | Refills: 4 | Status: SHIPPED | OUTPATIENT
Start: 2025-04-30

## 2025-05-02 RX ORDER — LOSARTAN POTASSIUM 50 MG/1
50 TABLET ORAL 2 TIMES DAILY
Qty: 60 TABLET | Refills: 4 | OUTPATIENT
Start: 2025-05-02

## 2025-05-09 ENCOUNTER — LAB VISIT (OUTPATIENT)
Dept: LAB | Facility: HOSPITAL | Age: 35
End: 2025-05-09
Attending: INTERNAL MEDICINE
Payer: MEDICARE

## 2025-05-09 ENCOUNTER — RESULTS FOLLOW-UP (OUTPATIENT)
Dept: PRIMARY CARE CLINIC | Facility: CLINIC | Age: 35
End: 2025-05-09

## 2025-05-09 DIAGNOSIS — E03.9 ACQUIRED HYPOTHYROIDISM: ICD-10-CM

## 2025-05-09 DIAGNOSIS — Z51.89 ENCOUNTER FOR MEDICATION ADJUSTMENT: ICD-10-CM

## 2025-05-09 LAB — TSH SERPL-ACNC: 0.76 UIU/ML (ref 0.35–4.94)

## 2025-05-09 PROCEDURE — 36415 COLL VENOUS BLD VENIPUNCTURE: CPT

## 2025-05-09 PROCEDURE — 84443 ASSAY THYROID STIM HORMONE: CPT

## 2025-05-12 ENCOUNTER — TELEPHONE (OUTPATIENT)
Dept: PRIMARY CARE CLINIC | Facility: CLINIC | Age: 35
End: 2025-05-12
Payer: MEDICARE

## 2025-05-12 NOTE — TELEPHONE ENCOUNTER
----- Message from Colleen Hull MD sent at 5/9/2025 11:39 AM CDT -----  I like this one better, how does she feel?  ----- Message -----  From: Lab, Background User  Sent: 5/9/2025   9:47 AM CDT  To: Colleen Hull MD

## 2025-06-24 ENCOUNTER — PATIENT MESSAGE (OUTPATIENT)
Dept: PRIMARY CARE CLINIC | Facility: CLINIC | Age: 35
End: 2025-06-24
Payer: MEDICARE

## 2025-07-21 RX ORDER — ATORVASTATIN CALCIUM 10 MG/1
10 TABLET, FILM COATED ORAL
Qty: 30 TABLET | Refills: 11 | Status: SHIPPED | OUTPATIENT
Start: 2025-07-21

## 2025-07-21 RX ORDER — POTASSIUM CHLORIDE 750 MG/1
10 TABLET, EXTENDED RELEASE ORAL
Qty: 30 TABLET | Refills: 3 | Status: SHIPPED | OUTPATIENT
Start: 2025-07-21

## 2025-07-22 RX ORDER — METOPROLOL SUCCINATE 100 MG/1
100 TABLET, EXTENDED RELEASE ORAL
Qty: 30 TABLET | Refills: 3 | Status: SHIPPED | OUTPATIENT
Start: 2025-07-22

## 2025-09-04 DIAGNOSIS — I10 ESSENTIAL HYPERTENSION: Primary | ICD-10-CM

## 2025-09-06 RX ORDER — HYDROCHLOROTHIAZIDE 25 MG/1
25 TABLET ORAL
Qty: 30 TABLET | Refills: 2 | Status: SHIPPED | OUTPATIENT
Start: 2025-09-06